# Patient Record
Sex: FEMALE
[De-identification: names, ages, dates, MRNs, and addresses within clinical notes are randomized per-mention and may not be internally consistent; named-entity substitution may affect disease eponyms.]

---

## 2017-01-11 ENCOUNTER — LAB SERVICES (OUTPATIENT)
Dept: OTHER | Age: 52
End: 2017-01-11

## 2017-01-11 ENCOUNTER — CHARTING TRANS (OUTPATIENT)
Dept: OTHER | Age: 52
End: 2017-01-11

## 2017-01-11 LAB — RAPID STREP GROUP A: NORMAL

## 2017-01-15 ENCOUNTER — HOSPITAL ENCOUNTER (OUTPATIENT)
Age: 52
Discharge: HOME | End: 2017-01-15
Attending: FAMILY MEDICINE
Payer: COMMERCIAL

## 2017-01-15 ENCOUNTER — APPOINTMENT (OUTPATIENT)
Dept: GENERAL RADIOLOGY | Age: 52
End: 2017-01-15
Attending: FAMILY MEDICINE
Payer: COMMERCIAL

## 2017-01-15 VITALS
WEIGHT: 165 LBS | SYSTOLIC BLOOD PRESSURE: 117 MMHG | RESPIRATION RATE: 14 BRPM | TEMPERATURE: 99 F | HEART RATE: 92 BPM | BODY MASS INDEX: 27 KG/M2 | DIASTOLIC BLOOD PRESSURE: 81 MMHG | OXYGEN SATURATION: 99 %

## 2017-01-15 DIAGNOSIS — J40 BRONCHITIS: Primary | ICD-10-CM

## 2017-01-15 LAB — S PYO AG THROAT QL: POSITIVE

## 2017-01-15 PROCEDURE — 99214 OFFICE O/P EST MOD 30 MIN: CPT

## 2017-01-15 PROCEDURE — 99213 OFFICE O/P EST LOW 20 MIN: CPT

## 2017-01-15 PROCEDURE — 87430 STREP A AG IA: CPT

## 2017-01-15 PROCEDURE — 71020 XR CHEST PA + LAT CHEST (CPT=71020): CPT

## 2017-01-15 RX ORDER — AZITHROMYCIN 250 MG/1
TABLET, FILM COATED ORAL
Qty: 1 PACKAGE | Refills: 0 | Status: SHIPPED | OUTPATIENT
Start: 2017-01-15 | End: 2017-01-20

## 2017-01-15 NOTE — ED PROVIDER NOTES
Patient Seen in: [address]    History   Patient presents with:  Cough/URI    Stated Complaint: coughing    HPI    61-year-old female presents with 2 weeks of cough and sore throat.   She notes being seen on Wednesday at Comment: OCC      Review of Systems    Positive for stated complaint: coughing  Other systems are as noted in HPI. Constitutional and vital signs reviewed. All other systems reviewed and negative except as noted above.     PSFH elements r Oral Tab  500 mg once followed by 250 mg daily x 4 days  Qty: 1 Package Refills: 0

## 2017-01-15 NOTE — ED INITIAL ASSESSMENT (HPI)
Pt c/o coughing for the last week that has been getting progressively worse and she wants to be checked out for pneumonia.  Pt was prescribed Amoxicillin for strep throat and pt started using her inhaler again that she had from the past. No Active short of

## 2017-01-18 ENCOUNTER — OFFICE VISIT (OUTPATIENT)
Dept: INTERNAL MEDICINE CLINIC | Facility: CLINIC | Age: 52
End: 2017-01-18

## 2017-01-18 VITALS
WEIGHT: 174 LBS | DIASTOLIC BLOOD PRESSURE: 80 MMHG | HEIGHT: 65.5 IN | HEART RATE: 80 BPM | TEMPERATURE: 98 F | BODY MASS INDEX: 28.64 KG/M2 | SYSTOLIC BLOOD PRESSURE: 110 MMHG | OXYGEN SATURATION: 98 %

## 2017-01-18 DIAGNOSIS — J84.10 CALCIFIED GRANULOMA OF LUNG (HCC): ICD-10-CM

## 2017-01-18 DIAGNOSIS — J98.01 BRONCHOSPASM: ICD-10-CM

## 2017-01-18 DIAGNOSIS — J40 BRONCHITIS: ICD-10-CM

## 2017-01-18 DIAGNOSIS — Z76.89 ENCOUNTER TO ESTABLISH CARE: Primary | ICD-10-CM

## 2017-01-18 PROCEDURE — 99212 OFFICE O/P EST SF 10 MIN: CPT | Performed by: INTERNAL MEDICINE

## 2017-01-18 PROCEDURE — 99204 OFFICE O/P NEW MOD 45 MIN: CPT | Performed by: INTERNAL MEDICINE

## 2017-01-18 RX ORDER — CODEINE PHOSPHATE AND GUAIFENESIN 10; 100 MG/5ML; MG/5ML
10 SOLUTION ORAL EVERY 6 HOURS PRN
Qty: 240 ML | Refills: 0 | OUTPATIENT
Start: 2017-01-18 | End: 2017-04-21

## 2017-01-18 RX ORDER — PREDNISONE 10 MG/1
TABLET ORAL
Qty: 20 TABLET | Refills: 0 | Status: SHIPPED | OUTPATIENT
Start: 2017-01-18 | End: 2017-02-07 | Stop reason: ALTCHOICE

## 2017-01-18 RX ORDER — ALBUTEROL SULFATE 90 UG/1
AEROSOL, METERED RESPIRATORY (INHALATION)
Qty: 1 INHALER | Refills: 11 | Status: SHIPPED | OUTPATIENT
Start: 2017-01-18 | End: 2020-11-25

## 2017-02-07 ENCOUNTER — OFFICE VISIT (OUTPATIENT)
Dept: INTERNAL MEDICINE CLINIC | Facility: CLINIC | Age: 52
End: 2017-02-07

## 2017-02-07 VITALS
BODY MASS INDEX: 29 KG/M2 | RESPIRATION RATE: 14 BRPM | WEIGHT: 175 LBS | TEMPERATURE: 99 F | DIASTOLIC BLOOD PRESSURE: 70 MMHG | SYSTOLIC BLOOD PRESSURE: 108 MMHG | OXYGEN SATURATION: 98 % | HEART RATE: 74 BPM

## 2017-02-07 DIAGNOSIS — R05.9 COUGH: Primary | ICD-10-CM

## 2017-02-07 PROCEDURE — 99212 OFFICE O/P EST SF 10 MIN: CPT | Performed by: INTERNAL MEDICINE

## 2017-02-07 PROCEDURE — 99213 OFFICE O/P EST LOW 20 MIN: CPT | Performed by: INTERNAL MEDICINE

## 2017-02-07 RX ORDER — FLUTICASONE PROPIONATE AND SALMETEROL 250; 50 UG/1; UG/1
1 POWDER RESPIRATORY (INHALATION) EVERY 12 HOURS SCHEDULED
Qty: 60 EACH | Refills: 0 | Status: SHIPPED | OUTPATIENT
Start: 2017-02-07 | End: 2017-03-09

## 2017-02-07 RX ORDER — FLUTICASONE PROPIONATE AND SALMETEROL 250; 50 UG/1; UG/1
1 POWDER RESPIRATORY (INHALATION) EVERY 12 HOURS SCHEDULED
Qty: 60 EACH | Refills: 0 | OUTPATIENT
Start: 2017-02-07 | End: 2017-02-07

## 2017-02-07 NOTE — PROGRESS NOTES
Bashir Haider is a 49year old female. HPI:   Patient presents with:  Checkup: cough persists 3-4 wks  Chest Congestion  Cough       Patient is still troubled with the cough. I did review Dr. Peggy Burnett note from January 18, 2017. Cough is mostly dry. Abnormal Pap smear of cervix 2007      ascus pap, neg hpv, neg colpo   • Melanoma ([address] ~2007     Melanoma R thigh, level 0.36 - Dr. Radha [place] at [facility]   • Kidney infection 1974     age 7, [address]   • History of pneumonia ~2009     oupatient treatmen another visit. She is comfortable with this plan. The patient indicates understanding of these issues and agrees to the plan. The patient is asked to call in 7-10 days with progress report. Juan Francisco Long MD  2/7/2017  8:52 AM  Merlin Gulling

## 2017-04-21 ENCOUNTER — OFFICE VISIT (OUTPATIENT)
Dept: PODIATRY CLINIC | Facility: CLINIC | Age: 52
End: 2017-04-21

## 2017-04-21 DIAGNOSIS — M72.2 PLANTAR FASCIITIS, RIGHT: Primary | ICD-10-CM

## 2017-04-21 PROCEDURE — 99203 OFFICE O/P NEW LOW 30 MIN: CPT | Performed by: PODIATRIST

## 2017-04-21 PROCEDURE — 99212 OFFICE O/P EST SF 10 MIN: CPT | Performed by: PODIATRIST

## 2017-04-21 NOTE — PROGRESS NOTES
HPI:    Patient ID: Gina Shaffer is a 49year old female. HPI  This 78-year-old female presents as a new patient to me and is referred by family. Her chief complaint is right foot pain it has been present only for the last few weeks.   There is no hist x-ray.  Reevaluate in 2 weeks         ASSESSMENT/PLAN:   Plantar fasciitis, right  (primary encounter diagnosis)    No orders of the defined types were placed in this encounter.        Meds This Visit:  No prescriptions requested or ordered in this encounte

## 2017-05-04 ENCOUNTER — OFFICE VISIT (OUTPATIENT)
Dept: PODIATRY CLINIC | Facility: CLINIC | Age: 52
End: 2017-05-04

## 2017-05-04 DIAGNOSIS — M72.2 PLANTAR FASCIITIS, RIGHT: ICD-10-CM

## 2017-05-04 DIAGNOSIS — M77.50 TENDONITIS OF ANKLE OR FOOT: ICD-10-CM

## 2017-05-04 DIAGNOSIS — R52 PAIN: Primary | ICD-10-CM

## 2017-05-04 PROCEDURE — 99213 OFFICE O/P EST LOW 20 MIN: CPT | Performed by: PODIATRIST

## 2017-05-04 PROCEDURE — 99212 OFFICE O/P EST SF 10 MIN: CPT | Performed by: PODIATRIST

## 2017-05-04 RX ORDER — METHYLPREDNISOLONE 4 MG/1
TABLET ORAL
Qty: 1 PACKAGE | Refills: 0 | Status: SHIPPED | OUTPATIENT
Start: 2017-05-04 | End: 2017-05-24

## 2017-05-04 NOTE — PROGRESS NOTES
HPI:    Patient ID: Vani Collins is a 49year old female. HPI  This 52-year-old female presents with continued pain in the right foot. She would state that over the last couple of weeks she has not really any better.   The pain is noted when first luis alfredo shoes at all times no barefoot walking. I also switched her to a Medrol dose pack. I reviewed the use of the medication and my expectations. I instructed her to ice the lateral foot and ankle at least twice a day for 15 minutes.   We will plan to reevalu

## 2017-05-24 ENCOUNTER — OFFICE VISIT (OUTPATIENT)
Dept: PODIATRY CLINIC | Facility: CLINIC | Age: 52
End: 2017-05-24

## 2017-05-24 DIAGNOSIS — M76.71 PERONEAL TENDONITIS, RIGHT: Primary | ICD-10-CM

## 2017-05-24 PROCEDURE — 99213 OFFICE O/P EST LOW 20 MIN: CPT | Performed by: PODIATRIST

## 2017-05-24 PROCEDURE — 99212 OFFICE O/P EST SF 10 MIN: CPT | Performed by: PODIATRIST

## 2017-05-24 NOTE — PROGRESS NOTES
HPI:    Patient ID: Rick Sams is a 49year old female. HPI  This 52-year-old female presents with continued pain on the lateral aspect of the right foot.   She states that the oral medication helped for a couple of days even as much is 90% but shortl (primary encounter diagnosis)    No orders of the defined types were placed in this encounter.        Meds This Visit:  No prescriptions requested or ordered in this encounter    Imaging & Referrals:  MRI FOOT, RIGHT (CPT=73718)       VL#[id number]

## 2017-05-25 ENCOUNTER — TELEPHONE (OUTPATIENT)
Dept: PODIATRY CLINIC | Facility: CLINIC | Age: 52
End: 2017-05-25

## 2017-05-25 NOTE — TELEPHONE ENCOUNTER
Called [facility]IM and s/w Brie to initiate PA for MRI right foot w/o con. Gave clinicals per SCR OV notes. MRI approved auth # [id number] exp 6/23/17 at [address] [place] [address]. Called pt LMOM per MRI auth and exp date. Gave phone # to CS to make appt and f/u after MRI.  CB if any

## 2017-05-26 ENCOUNTER — TELEPHONE (OUTPATIENT)
Dept: PODIATRY CLINIC | Facility: CLINIC | Age: 52
End: 2017-05-26

## 2017-05-26 DIAGNOSIS — M76.71: Primary | ICD-10-CM

## 2017-05-26 NOTE — TELEPHONE ENCOUNTER
Patient states is getting MRI done on 05/27/17. Requesting to get MRI results over the phone (declined f/u appt)  when available since next available appt wont be until 06/19/17 and has been with pain on her foot for a while now. Please advise. Thank you.

## 2017-05-26 NOTE — TELEPHONE ENCOUNTER
Spoke to pt and informed her that I will be calling SCR on Tuesday when we reopen and have the MRI results and have SCR interpret those results for me. Informed pt that I will call her once I speak with Meryl Redmond on Tuesday.  Pt is aware that SCR will still be

## 2017-05-27 ENCOUNTER — HOSPITAL ENCOUNTER (OUTPATIENT)
Dept: MRI IMAGING | Age: 52
Discharge: HOME | End: 2017-05-27
Attending: PODIATRIST
Payer: COMMERCIAL

## 2017-05-27 DIAGNOSIS — M76.71 PERONEAL TENDONITIS, RIGHT: ICD-10-CM

## 2017-05-27 PROCEDURE — 73718 MRI LOWER EXTREMITY W/O DYE: CPT | Performed by: PODIATRIST

## 2017-05-31 PROCEDURE — L4386 NON-PNEUM WALK BOOT PRE CST: HCPCS | Performed by: PODIATRIST

## 2017-05-31 NOTE — TELEPHONE ENCOUNTER
Spoke to pt and informed her of SCR message below from today. Pt will come to ortho/podiatry office at [facility] in [place] today sometime between [address] [facility] today to be fitted with CAM walker and given PT order. Pt verbalized understanding.

## 2017-05-31 NOTE — TELEPHONE ENCOUNTER
Pt came by office and the following was completed:  - fitted pt with CAM Walker size small -- pt and myself agreed this was an appropriate size- Discussed pt wearing whenever she is weightbearing - Advised pt to not drive with boot on.   - gave PT referral

## 2017-05-31 NOTE — TELEPHONE ENCOUNTER
Per Dr. Helene Camp, after discussing MRI right foot report:  -- Pt has significant tendinitis of peroneus longus. -- Nothing is torn. -- Have pt come to office to fit with CAM walker.  Immobilize in CAM walker with all weightbearing.  -- Pt is to go to PT 3 t

## 2017-06-12 ENCOUNTER — TELEPHONE (OUTPATIENT)
Dept: PODIATRY CLINIC | Facility: CLINIC | Age: 52
End: 2017-06-12

## 2017-06-12 NOTE — TELEPHONE ENCOUNTER
Called [facility]IE RAMIREZ and spoke to [facility]. Informed her of SCR instructions. [facility] verbalized understanding.

## 2017-06-12 NOTE — TELEPHONE ENCOUNTER
Laina Childress asking how long pt will be using cam boot, also asking if pt can do weight bearing exercises without boot on? Pls call thank you.

## 2017-06-30 ENCOUNTER — TELEPHONE (OUTPATIENT)
Dept: ORTHOPEDICS CLINIC | Facility: CLINIC | Age: 52
End: 2017-06-30

## 2017-06-30 NOTE — TELEPHONE ENCOUNTER
Paola Aguero called back. Pt instructed on Dr. Joseluis Lopez response. Denies any further questions.

## 2017-06-30 NOTE — TELEPHONE ENCOUNTER
Pt here thought she had an  Appointment. She does not. Has been wearing tall cam walker. Has been going to therapy. States she is much better now after therapy. While wearing the boot but has mostly no pain.  Sometimes at the end of the day if standing a lo

## 2017-06-30 NOTE — TELEPHONE ENCOUNTER
im not anxious to reduce the boot too quickly, but gradual reduction in use over the next week - 10 days is fine. scr

## 2017-07-14 ENCOUNTER — OFFICE VISIT (OUTPATIENT)
Dept: PODIATRY CLINIC | Facility: CLINIC | Age: 52
End: 2017-07-14

## 2017-07-14 DIAGNOSIS — M76.71: Primary | ICD-10-CM

## 2017-07-14 PROCEDURE — 99212 OFFICE O/P EST SF 10 MIN: CPT | Performed by: PODIATRIST

## 2017-07-14 PROCEDURE — 99213 OFFICE O/P EST LOW 20 MIN: CPT | Performed by: PODIATRIST

## 2017-07-14 NOTE — PROGRESS NOTES
HPI:    Patient ID: Henry Doll is a 47year old female. HPI  This 26-year-old female presents for follow-up. Patient states that the immobilization occasional oral anti-inflammatories and physical therapy has made her approximately 65% better.   She

## 2017-08-04 ENCOUNTER — OFFICE VISIT (OUTPATIENT)
Dept: PODIATRY CLINIC | Facility: CLINIC | Age: 52
End: 2017-08-04

## 2017-08-04 DIAGNOSIS — M76.71: Primary | ICD-10-CM

## 2017-08-04 PROCEDURE — 99212 OFFICE O/P EST SF 10 MIN: CPT | Performed by: PODIATRIST

## 2017-08-04 NOTE — PROGRESS NOTES
HPI:    Patient ID: Dudley Lee is a 47year old female. HPI  This 27-year-old female presents with continued improvement as described. She is having less and less discomfort and feels as though she continues to improve.   Review of Systems  Patient i

## 2018-06-26 PROCEDURE — 87624 HPV HI-RISK TYP POOLED RSLT: CPT | Performed by: OBSTETRICS & GYNECOLOGY

## 2018-06-26 PROCEDURE — 88175 CYTOPATH C/V AUTO FLUID REDO: CPT | Performed by: OBSTETRICS & GYNECOLOGY

## 2018-07-13 PROBLEM — N60.12 FIBROCYSTIC CHANGES OF LEFT BREAST: Status: ACTIVE | Noted: 2018-07-13

## 2018-11-05 VITALS
RESPIRATION RATE: 16 BRPM | TEMPERATURE: 98.2 F | SYSTOLIC BLOOD PRESSURE: 102 MMHG | DIASTOLIC BLOOD PRESSURE: 60 MMHG | HEART RATE: 81 BPM | HEIGHT: 66 IN | OXYGEN SATURATION: 98 %

## 2019-09-21 ENCOUNTER — HOSPITAL ENCOUNTER (OUTPATIENT)
Age: 54
Discharge: ANOTHER HOSPITAL | End: 2019-09-21
Attending: EMERGENCY MEDICINE
Payer: COMMERCIAL

## 2019-09-21 VITALS
HEIGHT: 66 IN | RESPIRATION RATE: 18 BRPM | TEMPERATURE: 98 F | WEIGHT: 189 LBS | BODY MASS INDEX: 30.37 KG/M2 | HEART RATE: 82 BPM | OXYGEN SATURATION: 99 % | DIASTOLIC BLOOD PRESSURE: 77 MMHG | SYSTOLIC BLOOD PRESSURE: 109 MMHG

## 2019-09-21 DIAGNOSIS — R10.9 ABDOMINAL PAIN, ACUTE: Primary | ICD-10-CM

## 2019-09-21 PROCEDURE — 99212 OFFICE O/P EST SF 10 MIN: CPT

## 2019-09-21 PROCEDURE — 99213 OFFICE O/P EST LOW 20 MIN: CPT

## 2019-09-21 NOTE — ED PROVIDER NOTES
Patient Seen in: [address]      History   Patient presents with:  Abdomen/Flank Pain (GI/)    Stated Complaint: stomach pain    HPI    Patient complains of abdominal pain.   Patient stated about 6 days ago she had chil 99 %   O2 Device None (Room air)       Current:/77   Pulse 82   Temp 98.4 °F (36.9 °C) (Oral)   Resp 18   Ht 167.6 cm (5' 6\")   Wt 85.7 kg   LMP 05/08/2016 (Approximate)   SpO2 99%   BMI 30.51 kg/m²         Physical Exam    Patient is awake and aler

## 2019-11-15 ENCOUNTER — NURSE ONLY (OUTPATIENT)
Dept: INTERNAL MEDICINE CLINIC | Facility: CLINIC | Age: 54
End: 2019-11-15
Payer: COMMERCIAL

## 2019-11-15 DIAGNOSIS — Z23 NEED FOR INFLUENZA VACCINATION: Primary | ICD-10-CM

## 2019-11-15 PROCEDURE — 90686 IIV4 VACC NO PRSV 0.5 ML IM: CPT | Performed by: INTERNAL MEDICINE

## 2019-11-15 PROCEDURE — 90471 IMMUNIZATION ADMIN: CPT | Performed by: INTERNAL MEDICINE

## 2019-12-30 PROBLEM — Z80.0 FAMILY HISTORY OF COLON CANCER IN FATHER: Status: ACTIVE | Noted: 2019-12-30

## 2019-12-30 PROBLEM — Z86.0101 HISTORY OF ADENOMATOUS POLYP OF COLON: Status: ACTIVE | Noted: 2019-12-30

## 2019-12-30 PROBLEM — Z86.010 HISTORY OF ADENOMATOUS POLYP OF COLON: Status: ACTIVE | Noted: 2019-12-30

## 2019-12-30 PROCEDURE — 88305 TISSUE EXAM BY PATHOLOGIST: CPT | Performed by: INTERNAL MEDICINE

## 2020-03-19 ENCOUNTER — OFFICE VISIT (OUTPATIENT)
Dept: INTERNAL MEDICINE CLINIC | Facility: CLINIC | Age: 55
End: 2020-03-19
Payer: COMMERCIAL

## 2020-03-19 ENCOUNTER — TELEPHONE (OUTPATIENT)
Dept: INTERNAL MEDICINE CLINIC | Facility: CLINIC | Age: 55
End: 2020-03-19

## 2020-03-19 VITALS
HEIGHT: 66 IN | TEMPERATURE: 98 F | SYSTOLIC BLOOD PRESSURE: 116 MMHG | HEART RATE: 68 BPM | BODY MASS INDEX: 30.53 KG/M2 | DIASTOLIC BLOOD PRESSURE: 80 MMHG | WEIGHT: 190 LBS | OXYGEN SATURATION: 99 %

## 2020-03-19 DIAGNOSIS — S61.011A LACERATION OF RIGHT THUMB WITHOUT FOREIGN BODY WITHOUT DAMAGE TO NAIL, INITIAL ENCOUNTER: Primary | ICD-10-CM

## 2020-03-19 PROCEDURE — 90471 IMMUNIZATION ADMIN: CPT | Performed by: INTERNAL MEDICINE

## 2020-03-19 PROCEDURE — 90715 TDAP VACCINE 7 YRS/> IM: CPT | Performed by: INTERNAL MEDICINE

## 2020-03-19 PROCEDURE — 99213 OFFICE O/P EST LOW 20 MIN: CPT | Performed by: INTERNAL MEDICINE

## 2020-03-19 NOTE — PROGRESS NOTES
Inder Griffith is a 49year old female. Patient presents with: Injury: R Thumb     HPI:   Patient presents with: Injury: R Thumb     Patient is seen today for evaluation of a laceration to her right thumb.   Patient injured her right thumb working with No       REVIEW OF SYSTEMS:   GENERAL HEALTH: feels well otherwise  RESPIRATORY:No cough or SOB  CARDIOVASCULAR: No chest pain  GI: No abdominal pain, nausea, vomiting, diarrhea, or constipation  :No Urinary complaints  EXT:No complaints of pain or swell scheduled. Syed Carl MD  3/19/2020  11:47 AM

## 2020-03-19 NOTE — PATIENT INSTRUCTIONS
1.  Patient is to continue her current diet, medications and activity. 2.  Patient was instructed to keep the thumb clean. She may use a warm face cloth to clean up some dried blood if necessary.   Patient to change a Band-Aid over the area twice a day or

## 2020-03-19 NOTE — TELEPHONE ENCOUNTER
I spoke with patient and she cut the pad of her right thumb with a kitchen slicer on Sunday night. She thought about urgent care for stitches on Monday but was hesitant to go out at this time. She has it wrapped. There is no bleeding right now. It is red with minimal swelling, no drainage. There is no fever, no chills or sweats. She is not sure if she has had a Tdap vaccine in past 10 years. She would prefer to use [facility] in [facility] if anything is needed. Explained that I would relay her concerns to the doctor on call. She verbalized understanding. Dr. Audrey Salazar, please advise.

## 2020-03-19 NOTE — TELEPHONE ENCOUNTER
Cut right thumb on Sunday  Calling for direction as she is in pain and has a gaping wound    [phone]

## 2020-03-19 NOTE — TELEPHONE ENCOUNTER
Telephone call to patient and situation discussed. Patient has not had a tetanus shot in the last 10 years according to our records. Patient has a wound on her thumb which is fairly deep but does not appear to be infected. I have advised the patient to come to see me today at 11:30 so I can check the wound we can also give her a Tdap vaccine. I will route this to nursing to note that work I will see the patient 11:30 today. Nursing can forward this to the .   Thank you!!

## 2020-11-16 ENCOUNTER — TELEPHONE (OUTPATIENT)
Dept: INTERNAL MEDICINE CLINIC | Facility: CLINIC | Age: 55
End: 2020-11-16

## 2020-11-16 ENCOUNTER — APPOINTMENT (OUTPATIENT)
Dept: CT IMAGING | Age: 55
End: 2020-11-16
Attending: NURSE PRACTITIONER
Payer: COMMERCIAL

## 2020-11-16 ENCOUNTER — HOSPITAL ENCOUNTER (OUTPATIENT)
Age: 55
Discharge: HOME | End: 2020-11-16
Payer: COMMERCIAL

## 2020-11-16 VITALS
TEMPERATURE: 98 F | RESPIRATION RATE: 18 BRPM | SYSTOLIC BLOOD PRESSURE: 133 MMHG | OXYGEN SATURATION: 98 % | HEART RATE: 96 BPM | DIASTOLIC BLOOD PRESSURE: 92 MMHG

## 2020-11-16 DIAGNOSIS — R10.9 ABDOMINAL PAIN, ACUTE: Primary | ICD-10-CM

## 2020-11-16 PROCEDURE — 80047 BASIC METABLC PNL IONIZED CA: CPT

## 2020-11-16 PROCEDURE — 99214 OFFICE O/P EST MOD 30 MIN: CPT

## 2020-11-16 PROCEDURE — 74177 CT ABD & PELVIS W/CONTRAST: CPT | Performed by: NURSE PRACTITIONER

## 2020-11-16 PROCEDURE — 96360 HYDRATION IV INFUSION INIT: CPT

## 2020-11-16 PROCEDURE — 85025 COMPLETE CBC W/AUTO DIFF WBC: CPT | Performed by: NURSE PRACTITIONER

## 2020-11-16 PROCEDURE — 81002 URINALYSIS NONAUTO W/O SCOPE: CPT

## 2020-11-16 RX ORDER — SODIUM CHLORIDE 9 MG/ML
1000 INJECTION, SOLUTION INTRAVENOUS ONCE
Status: COMPLETED | OUTPATIENT
Start: 2020-11-16 | End: 2020-11-16

## 2020-11-16 NOTE — ED NOTES
ISTAT OBTAINED, RESULTS AS FOLLOWS:  NA: 142  K 3.7  Cl 104  iCA 1.23  tCO2 22  Glu 99  BUN 16  Creatinine 0.8  HCT % 45

## 2020-11-16 NOTE — TELEPHONE ENCOUNTER
Spoke to patient who reports abdominal pain started yesterday. Pain is the L lower abdomen, pain at worse is 7/10. She is taking advil which has been helping with the pain. Pain seems to be pretty constant.  She denies any diarrhea, change in bowel habits,

## 2020-11-16 NOTE — ED INITIAL ASSESSMENT (HPI)
PATIENT REPORTS HX OF DIVERTICULITIS.  + LLQ PAIN SINCE YESTERDAY. SPOKE TO HER PCP WHO RECOMMENDS CT.  DENIES DIARRHEA, DENIES EMESIS.

## 2020-11-16 NOTE — TELEPHONE ENCOUNTER
To nursing, tell patient, the best course of action would be for her to go to the emergency room because based on what she said, I expect she will need imaging that they can do expeditiously there. Thanks. (I last saw patient in 2017).

## 2020-11-16 NOTE — TELEPHONE ENCOUNTER
Patient calling for antibiotic. Diverticulitis has flared up. In the past has taken antibiotic and diet.      [place]    Best number to contact patient at [phone]

## 2020-11-17 RX ORDER — CIPROFLOXACIN 500 MG/1
500 TABLET, FILM COATED ORAL 2 TIMES DAILY
Qty: 14 TABLET | Refills: 0 | Status: SHIPPED | OUTPATIENT
Start: 2020-11-17 | End: 2020-11-25

## 2020-11-17 RX ORDER — METRONIDAZOLE 250 MG/1
250 TABLET ORAL 3 TIMES DAILY
Qty: 21 TABLET | Refills: 0 | Status: SHIPPED | OUTPATIENT
Start: 2020-11-17 | End: 2020-11-25

## 2020-11-17 NOTE — TELEPHONE ENCOUNTER
To nursing, I recommend Tylenol instead of Advil. I would recommend she start antibiotics for possible diverticulitis–Rx's pended for Cipro 500 mg twice daily #14 and Flagyl 250 mg 3 times daily #21. Please send it to her pharmacy.   Important she keep ap

## 2020-11-17 NOTE — TELEPHONE ENCOUNTER
Relayed MD's message to patient---verbalized understanding. She is scheduled to see Dr. Jose Salas tomorrow at 11am  Pt has been taking Advil for pain she takes 2 tablets up to 3 times daily-okay to continue this?  Pt was not prescribed any medication at immediate c

## 2020-11-17 NOTE — TELEPHONE ENCOUNTER
To nursing, please call patient–did the [facility] immediate care give her any prescriptions? The CAT scan was suggestive of colitis or a colon lesion. Will need further evaluation. The note in the chart is not complete.   She needs to see me in the office

## 2020-11-17 NOTE — TELEPHONE ENCOUNTER
To Dr. Rebeca Valdez - UC/ F/U -  Pt feeling better today, \"not quite as acute\" - see CT results. Eating bland diet ffor a few days  Denies fever/chills.

## 2020-11-17 NOTE — ED PROVIDER NOTES
Patient presents with:  Abdomen/Flank Pain      HPI:     Bakari Dumont is a 48year old female who presents with a chief complaint of generalized abdominal pain that is worse on the left that started yesterday.   The patient states a few days ago she did eat some p file        Non-medical: Not on file    Tobacco Use      Smoking status: Former Smoker      Smokeless tobacco: Never Used    Substance and Sexual Activity      Alcohol use:  Yes        Alcohol/week: 5.0 standard drinks        Types: 5 Standard drinks or equ quadrants with palpation. She also has some discomfort to the epigastric area. Negative McBurney's point. Negative Martini sign. No CVA tenderness. No distention or masses palpated. Bowel sounds are active in all 4 quadrants.   NEURO: No focal deficits (CST): Adenike Chance MD on 11/16/2020 at 5:53 PM          The patient's white blood cell count is 11. Her i-STAT is within normal limits. Her urine dip is negative. Her CT results are as above. There is are no signs of diverticulitis.   I reexamined

## 2020-11-18 ENCOUNTER — OFFICE VISIT (OUTPATIENT)
Dept: INTERNAL MEDICINE CLINIC | Facility: CLINIC | Age: 55
End: 2020-11-18
Payer: COMMERCIAL

## 2020-11-18 VITALS
DIASTOLIC BLOOD PRESSURE: 72 MMHG | BODY MASS INDEX: 32 KG/M2 | TEMPERATURE: 98 F | SYSTOLIC BLOOD PRESSURE: 118 MMHG | HEART RATE: 67 BPM | OXYGEN SATURATION: 97 % | RESPIRATION RATE: 14 BRPM | WEIGHT: 199 LBS

## 2020-11-18 DIAGNOSIS — K57.32 DIVERTICULITIS OF SIGMOID COLON: Primary | ICD-10-CM

## 2020-11-18 PROCEDURE — 99072 ADDL SUPL MATRL&STAF TM PHE: CPT | Performed by: INTERNAL MEDICINE

## 2020-11-18 PROCEDURE — 3078F DIAST BP <80 MM HG: CPT | Performed by: INTERNAL MEDICINE

## 2020-11-18 PROCEDURE — 3074F SYST BP LT 130 MM HG: CPT | Performed by: INTERNAL MEDICINE

## 2020-11-18 PROCEDURE — 99214 OFFICE O/P EST MOD 30 MIN: CPT | Performed by: INTERNAL MEDICINE

## 2020-11-18 NOTE — PROGRESS NOTES
Ruth Abdul is a 48year old female who presents for     Immediate care follow-up  Onset 11/15/20, LLQ abd pain. No diarrhea, change in bowels, nausea, vomiting, fever or chills.   Pt felt it was the same pain as when diagnosed in 10/2019 when mike torres CONTRAST  2019    acute sigmoid diverticulitis      Family History   Problem Relation Age of Onset   • Heart Attack Father          age 80   • Arthritis Father    • Melanoma Father    • Colon Cancer Father    • Other (Other) Father         katelin circumferential wall thickening sigmoid with surrounding inflammatory change, scattered colonic diverticulosis, no definite evid diverticula in region of colonic wall thickening.  Could reflect a nonspecific colitis.   Clinically picture is c/w sigmoid dive

## 2020-11-25 ENCOUNTER — OFFICE VISIT (OUTPATIENT)
Dept: INTERNAL MEDICINE CLINIC | Facility: CLINIC | Age: 55
End: 2020-11-25
Payer: COMMERCIAL

## 2020-11-25 VITALS
HEIGHT: 66 IN | BODY MASS INDEX: 31.98 KG/M2 | HEART RATE: 84 BPM | DIASTOLIC BLOOD PRESSURE: 82 MMHG | WEIGHT: 199 LBS | TEMPERATURE: 99 F | SYSTOLIC BLOOD PRESSURE: 130 MMHG

## 2020-11-25 DIAGNOSIS — K57.32 DIVERTICULITIS OF SIGMOID COLON: ICD-10-CM

## 2020-11-25 DIAGNOSIS — Z85.828 HISTORY OF SKIN CANCER: ICD-10-CM

## 2020-11-25 DIAGNOSIS — Z86.010 HISTORY OF ADENOMATOUS POLYP OF COLON: ICD-10-CM

## 2020-11-25 DIAGNOSIS — Z00.00 ANNUAL PHYSICAL EXAM: Primary | ICD-10-CM

## 2020-11-25 PROCEDURE — 99396 PREV VISIT EST AGE 40-64: CPT | Performed by: INTERNAL MEDICINE

## 2020-11-25 PROCEDURE — 99072 ADDL SUPL MATRL&STAF TM PHE: CPT | Performed by: INTERNAL MEDICINE

## 2020-11-25 PROCEDURE — 3075F SYST BP GE 130 - 139MM HG: CPT | Performed by: INTERNAL MEDICINE

## 2020-11-25 PROCEDURE — 3079F DIAST BP 80-89 MM HG: CPT | Performed by: INTERNAL MEDICINE

## 2020-11-25 PROCEDURE — 3008F BODY MASS INDEX DOCD: CPT | Performed by: INTERNAL MEDICINE

## 2020-11-25 RX ORDER — CIPROFLOXACIN 500 MG/1
500 TABLET, FILM COATED ORAL 2 TIMES DAILY
Qty: 14 TABLET | Refills: 0 | Status: SHIPPED | OUTPATIENT
Start: 2020-11-25 | End: 2021-05-23

## 2020-11-25 RX ORDER — METRONIDAZOLE 250 MG/1
250 TABLET ORAL 3 TIMES DAILY
Qty: 21 TABLET | Refills: 0 | Status: SHIPPED | OUTPATIENT
Start: 2020-11-25 | End: 2021-05-23

## 2020-11-25 NOTE — PROGRESS NOTES
Mignon Lopez is a 48year old female who presents for     Check at 1 wk and this visit done as annual physical    LLQ pain is 95% better. Occasionally a \"trace of pain\" in LLQ. Took cipro flagyl --finished exc has 1 flagyl left.      Last colonos per week      Comment: OCC    Drug use: No         Allergies:  No Known Allergies    Review of systems:  Constitutional:  No fever, loss of appetite or unintentional weight loss  Respiratory: No cough, wheezing or dyspnea  Cardiac: No chest pain or palpita melanoma R thigh 2007, SCC & BCC R leg 10/2020--Dr Radha Covington. Per pt she will do wider excision in Dec 2020.        Healthcare maintenance:  Gyn: Dr Laurent Gonzalez. Mammo-per Dr Laurent Gonzalez.    Colonoscopy-12/30/19–Moise Ruvalcaba -sigmoid diverticulosis and 3 adenomatous po

## 2021-02-06 ENCOUNTER — PATIENT MESSAGE (OUTPATIENT)
Dept: INTERNAL MEDICINE CLINIC | Facility: CLINIC | Age: 56
End: 2021-02-06

## 2021-02-06 DIAGNOSIS — E66.9 OBESITY (BMI 30-39.9): Primary | ICD-10-CM

## 2021-02-08 NOTE — TELEPHONE ENCOUNTER
To nursing, pls mail her the referral entered. Thanks. 1. Obesity (BMI 30-39.9)  - SPECIALTY (OTHER) - EXTERNAL  Integrative medicine Referral requested by pt.  Pt advised this office the PCP needed to enter a referral before an appt., Ext - RFL, Routin

## 2021-02-08 NOTE — TELEPHONE ENCOUNTER
From: Bette Solano  To: Norma Day MD  Sent: 2/6/2021 12:24 PM CST  Subject: Referral Request    Dr. Ruth Brennan - I would like a referral to see Dr. Mathew Mcbride, she is the Director of the  for [address] [place] [address] [place] [facility]  Kimberli Goldstein

## 2021-04-02 ENCOUNTER — IMMUNIZATION (OUTPATIENT)
Dept: LAB | Facility: HOSPITAL | Age: 56
End: 2021-04-02
Attending: HOSPITALIST
Payer: COMMERCIAL

## 2021-04-02 DIAGNOSIS — Z23 NEED FOR VACCINATION: Primary | ICD-10-CM

## 2021-04-02 PROCEDURE — 0011A SARSCOV2 VAC 100MCG/0.5ML IM: CPT

## 2021-04-30 ENCOUNTER — IMMUNIZATION (OUTPATIENT)
Dept: LAB | Facility: HOSPITAL | Age: 56
End: 2021-04-30
Attending: EMERGENCY MEDICINE
Payer: COMMERCIAL

## 2021-04-30 DIAGNOSIS — Z23 NEED FOR VACCINATION: Primary | ICD-10-CM

## 2021-04-30 PROCEDURE — 0012A SARSCOV2 VAC 100MCG/0.5ML IM: CPT

## 2021-05-20 ENCOUNTER — LAB ENCOUNTER (OUTPATIENT)
Dept: LAB | Facility: HOSPITAL | Age: 56
End: 2021-05-20
Attending: INTERNAL MEDICINE
Payer: COMMERCIAL

## 2021-05-20 DIAGNOSIS — Z00.00 ANNUAL PHYSICAL EXAM: ICD-10-CM

## 2021-05-20 PROCEDURE — 80053 COMPREHEN METABOLIC PANEL: CPT

## 2021-05-20 PROCEDURE — 36415 COLL VENOUS BLD VENIPUNCTURE: CPT

## 2021-05-20 PROCEDURE — 80061 LIPID PANEL: CPT

## 2021-05-20 PROCEDURE — 81001 URINALYSIS AUTO W/SCOPE: CPT | Performed by: INTERNAL MEDICINE

## 2021-05-20 PROCEDURE — 85025 COMPLETE CBC W/AUTO DIFF WBC: CPT

## 2021-05-20 PROCEDURE — 84443 ASSAY THYROID STIM HORMONE: CPT

## 2021-05-21 ENCOUNTER — TELEPHONE (OUTPATIENT)
Dept: INTERNAL MEDICINE CLINIC | Facility: CLINIC | Age: 56
End: 2021-05-21

## 2021-05-21 ENCOUNTER — PATIENT MESSAGE (OUTPATIENT)
Dept: INTERNAL MEDICINE CLINIC | Facility: CLINIC | Age: 56
End: 2021-05-21

## 2021-05-21 DIAGNOSIS — R82.90 ABNORMAL URINALYSIS: Primary | ICD-10-CM

## 2021-05-21 DIAGNOSIS — R79.89 ELEVATED LFTS: Primary | ICD-10-CM

## 2021-05-21 NOTE — TELEPHONE ENCOUNTER
From: Julia Fontenot  To: Lee Perez MD  Sent: 5/21/2021 4:51 PM CDT  Subject: Test Results Question    Dr. Mignon Rapp - I do not take any herbal supplements. All of the vitamins/supplements I take are in my file.  I drink 2-3 times per week, typica

## 2021-05-21 NOTE — TELEPHONE ENCOUNTER
I sent patient results note Aimee Gilford, your lab results show some abnormalities that include   1) elevated liver enzymes (ALT 93, AST 50)--these levels were normal in 2015. Could you please let us know–are you taking any new medications?   Any her

## 2021-05-23 RX ORDER — CALCIUM CARBONATE 500 MG/1
TABLET, CHEWABLE ORAL
COMMUNITY
Start: 2021-01-01

## 2021-06-01 NOTE — TELEPHONE ENCOUNTER
Patient called to inquire about how to go about doing repeat blood work, plus urine test. Instructions provided. Call transferred to schedule lab appt.

## 2021-06-04 ENCOUNTER — TELEPHONE (OUTPATIENT)
Dept: INTERNAL MEDICINE CLINIC | Facility: CLINIC | Age: 56
End: 2021-06-04

## 2021-06-04 ENCOUNTER — LAB ENCOUNTER (OUTPATIENT)
Dept: LAB | Facility: HOSPITAL | Age: 56
End: 2021-06-04
Attending: INTERNAL MEDICINE
Payer: COMMERCIAL

## 2021-06-04 DIAGNOSIS — R79.89 ELEVATED LFTS: ICD-10-CM

## 2021-06-04 DIAGNOSIS — R82.90 ABNORMAL URINALYSIS: ICD-10-CM

## 2021-06-04 PROCEDURE — 36415 COLL VENOUS BLD VENIPUNCTURE: CPT

## 2021-06-04 PROCEDURE — 87086 URINE CULTURE/COLONY COUNT: CPT

## 2021-06-04 PROCEDURE — 80076 HEPATIC FUNCTION PANEL: CPT

## 2021-06-04 NOTE — TELEPHONE ENCOUNTER
I sent pt results note email--  Devika Duenas, your liver blood tests are now normal. The urine culture is still pending. Dr Luis Yang. 6/4/21--hepatic fcn panel -nl. Ucx pending.

## 2021-06-08 ENCOUNTER — TELEPHONE (OUTPATIENT)
Dept: INTERNAL MEDICINE CLINIC | Facility: CLINIC | Age: 56
End: 2021-06-08

## 2021-06-08 NOTE — TELEPHONE ENCOUNTER
I sent pt results not email. Ricardo Campbell, your urine culture done 6/4/21 is negative. As you recall the urinalysis of 5/20/21 showed microscopic blood with 3-5 red blood cells under the microscope.    Since there is not urinary tract infection to explain the

## 2021-06-10 ENCOUNTER — PATIENT MESSAGE (OUTPATIENT)
Dept: INTERNAL MEDICINE CLINIC | Facility: CLINIC | Age: 56
End: 2021-06-10

## 2021-06-10 NOTE — TELEPHONE ENCOUNTER
From: Lincoln Hunter  To: Rebecca Reese MD  Sent: 6/10/2021 2:02 PM CDT  Subject: Test Results Question    Dr. Bryanna Gilliam I made an appointment with Dr. Lit Newman and the first available time is July 19th.  How urgent is it that I see someone in your

## 2021-06-23 ENCOUNTER — OFFICE VISIT (OUTPATIENT)
Dept: SURGERY | Facility: CLINIC | Age: 56
End: 2021-06-23
Payer: COMMERCIAL

## 2021-06-23 VITALS — DIASTOLIC BLOOD PRESSURE: 81 MMHG | SYSTOLIC BLOOD PRESSURE: 127 MMHG | HEART RATE: 67 BPM

## 2021-06-23 DIAGNOSIS — R31.29 MICROHEMATURIA: Primary | ICD-10-CM

## 2021-06-23 PROCEDURE — 99243 OFF/OP CNSLTJ NEW/EST LOW 30: CPT | Performed by: NURSE PRACTITIONER

## 2021-06-23 PROCEDURE — 3074F SYST BP LT 130 MM HG: CPT | Performed by: NURSE PRACTITIONER

## 2021-06-23 PROCEDURE — 3079F DIAST BP 80-89 MM HG: CPT | Performed by: NURSE PRACTITIONER

## 2021-06-23 NOTE — PROGRESS NOTES
HPI/Subjective:     Patient ID: Karolina Woodward is a 55year old female.     HPI     Patient is a 55year old female who presents to the clinic for consult at the request of, and a copy of this note will be sent to, Dr. Lynn Lala regarding hematuria Amy   • Vitamin D deficiency       Past Surgical History:   Procedure Laterality Date   •       x2   • COLONOSCOPY  15    DR Merleen Aase   • COLONOSCOPY  2019   • CT ABDOMEN AND PELVIS WITH CONTRAST  2019    acute sigmoid diverti lengthy discussion with Amrit Grady regarding the diagnosis and definition of asymptomatic microscopic hematuria.   We went over the relevant anatomy as well as the different possible etiologies and differential diagnoses including benign causes such

## 2021-07-20 ENCOUNTER — HOSPITAL ENCOUNTER (OUTPATIENT)
Dept: ULTRASOUND IMAGING | Facility: HOSPITAL | Age: 56
Discharge: HOME | End: 2021-07-20
Attending: NURSE PRACTITIONER
Payer: COMMERCIAL

## 2021-07-20 DIAGNOSIS — R31.29 MICROHEMATURIA: ICD-10-CM

## 2021-07-20 PROCEDURE — 76775 US EXAM ABDO BACK WALL LIM: CPT | Performed by: NURSE PRACTITIONER

## 2021-07-28 ENCOUNTER — PROCEDURE (OUTPATIENT)
Dept: SURGERY | Facility: CLINIC | Age: 56
End: 2021-07-28
Payer: COMMERCIAL

## 2021-07-28 VITALS — SYSTOLIC BLOOD PRESSURE: 132 MMHG | DIASTOLIC BLOOD PRESSURE: 78 MMHG | WEIGHT: 199 LBS | BODY MASS INDEX: 32 KG/M2

## 2021-07-28 DIAGNOSIS — R31.29 MICROHEMATURIA: Primary | ICD-10-CM

## 2021-07-28 PROCEDURE — 3078F DIAST BP <80 MM HG: CPT | Performed by: UROLOGY

## 2021-07-28 PROCEDURE — 52000 CYSTOURETHROSCOPY: CPT | Performed by: UROLOGY

## 2021-07-28 PROCEDURE — 3075F SYST BP GE 130 - 139MM HG: CPT | Performed by: UROLOGY

## 2021-07-28 RX ORDER — CIPROFLOXACIN 500 MG/1
500 TABLET, FILM COATED ORAL ONCE
Status: COMPLETED | OUTPATIENT
Start: 2021-07-28 | End: 2021-07-28

## 2021-07-28 RX ADMIN — CIPROFLOXACIN 500 MG: 500 TABLET, FILM COATED ORAL at 17:49:00

## 2021-07-28 NOTE — PROGRESS NOTES
[facility]  Follow-Up Visit    HPI: Becky Patel is a 55year old female presents for a follow up visit. Patient was last seen on 6/23/21 by [employer].     INTERVAL HISTORY: Here for office cystoscopy to complete evaluation of micr (HHM=77083)    Result Date: 7/21/2021  PROCEDURE: US KIDNEYS (JNS=[id number])  COMPARISON: None. INDICATIONS: Microhematuria  TECHNIQUE: Ultrasound examination was performed to visualize the kidneys and bladder.    FINDINGS:  RIGHT KIDNEY: Normal.  Right kidney

## 2021-12-22 ENCOUNTER — IMMUNIZATION (OUTPATIENT)
Dept: LAB | Facility: HOSPITAL | Age: 56
End: 2021-12-22
Attending: EMERGENCY MEDICINE
Payer: COMMERCIAL

## 2021-12-22 DIAGNOSIS — Z23 NEED FOR VACCINATION: Primary | ICD-10-CM

## 2021-12-22 PROCEDURE — 0064A SARSCOV2 VAC 50MCG/0.25ML IM: CPT

## 2022-06-02 ENCOUNTER — OFFICE VISIT (OUTPATIENT)
Dept: INTERNAL MEDICINE CLINIC | Facility: CLINIC | Age: 57
End: 2022-06-02
Payer: COMMERCIAL

## 2022-06-02 ENCOUNTER — TELEPHONE (OUTPATIENT)
Dept: INTERNAL MEDICINE CLINIC | Facility: CLINIC | Age: 57
End: 2022-06-02

## 2022-06-02 VITALS
DIASTOLIC BLOOD PRESSURE: 92 MMHG | SYSTOLIC BLOOD PRESSURE: 132 MMHG | BODY MASS INDEX: 30.05 KG/M2 | OXYGEN SATURATION: 98 % | TEMPERATURE: 98 F | WEIGHT: 187 LBS | HEART RATE: 74 BPM | HEIGHT: 66 IN

## 2022-06-02 DIAGNOSIS — M54.41 ACUTE RIGHT-SIDED LOW BACK PAIN WITH RIGHT-SIDED SCIATICA: Primary | ICD-10-CM

## 2022-06-02 PROCEDURE — 3080F DIAST BP >= 90 MM HG: CPT | Performed by: INTERNAL MEDICINE

## 2022-06-02 PROCEDURE — 3075F SYST BP GE 130 - 139MM HG: CPT | Performed by: INTERNAL MEDICINE

## 2022-06-02 PROCEDURE — 3008F BODY MASS INDEX DOCD: CPT | Performed by: INTERNAL MEDICINE

## 2022-06-02 PROCEDURE — 99214 OFFICE O/P EST MOD 30 MIN: CPT | Performed by: INTERNAL MEDICINE

## 2022-06-02 RX ORDER — METHYLPREDNISOLONE 4 MG/1
TABLET ORAL
Qty: 1 EACH | Refills: 0 | Status: SHIPPED | OUTPATIENT
Start: 2022-06-02

## 2022-06-02 RX ORDER — CYCLOBENZAPRINE HCL 5 MG
5 TABLET ORAL 3 TIMES DAILY PRN
Qty: 60 TABLET | Refills: 0 | Status: SHIPPED | OUTPATIENT
Start: 2022-06-02

## 2022-06-02 NOTE — TELEPHONE ENCOUNTER
Spoke with patient. Discussed it would be best to be eval'd in clinic. Pt agreeable to office visit.  Scheduled today at 5:30pm with Dr. Reji Obregon.

## 2022-06-02 NOTE — TELEPHONE ENCOUNTER
Patient calling for muscle relaxer. Pulled her back over the weekend. Moving large flower planters. Pain is at Lower back to the right side, running into butt and back of right leg. Taking Aleive and Advil, helps some with pain. Pain level is 7-8 out of 10. Difficulty to sleeping.     Best call back number  [phone]    JAXSON [id number]

## 2022-06-27 PROCEDURE — 88305 TISSUE EXAM BY PATHOLOGIST: CPT | Performed by: DERMATOLOGY

## 2022-06-29 ENCOUNTER — LAB REQUISITION (OUTPATIENT)
Dept: LAB | Facility: HOSPITAL | Age: 57
End: 2022-06-29
Payer: COMMERCIAL

## 2022-06-29 DIAGNOSIS — D48.5 NEOPLASM OF UNCERTAIN BEHAVIOR OF SKIN: ICD-10-CM

## 2022-12-12 ENCOUNTER — HOSPITAL ENCOUNTER (OUTPATIENT)
Facility: HOSPITAL | Age: 57
Setting detail: HOSPITAL OUTPATIENT SURGERY
Discharge: HOME | End: 2022-12-12
Attending: INTERNAL MEDICINE | Admitting: INTERNAL MEDICINE
Payer: COMMERCIAL

## 2022-12-12 VITALS
HEIGHT: 66 IN | HEART RATE: 61 BPM | RESPIRATION RATE: 19 BRPM | SYSTOLIC BLOOD PRESSURE: 114 MMHG | BODY MASS INDEX: 28.93 KG/M2 | OXYGEN SATURATION: 99 % | TEMPERATURE: 98 F | DIASTOLIC BLOOD PRESSURE: 83 MMHG | WEIGHT: 180 LBS

## 2022-12-12 PROCEDURE — 99152 MOD SED SAME PHYS/QHP 5/>YRS: CPT | Performed by: INTERNAL MEDICINE

## 2022-12-12 PROCEDURE — 0DJD8ZZ INSPECTION OF LOWER INTESTINAL TRACT, VIA NATURAL OR ARTIFICIAL OPENING ENDOSCOPIC: ICD-10-PCS | Performed by: INTERNAL MEDICINE

## 2022-12-12 RX ORDER — SODIUM CHLORIDE, SODIUM LACTATE, POTASSIUM CHLORIDE, CALCIUM CHLORIDE 600; 310; 30; 20 MG/100ML; MG/100ML; MG/100ML; MG/100ML
INJECTION, SOLUTION INTRAVENOUS CONTINUOUS
Status: DISCONTINUED | OUTPATIENT
Start: 2022-12-12 | End: 2022-12-12

## 2022-12-12 RX ORDER — MIDAZOLAM HYDROCHLORIDE 1 MG/ML
1 INJECTION INTRAMUSCULAR; INTRAVENOUS EVERY 5 MIN PRN
Status: DISCONTINUED | OUTPATIENT
Start: 2022-12-12 | End: 2022-12-12

## 2022-12-12 RX ORDER — MIDAZOLAM HYDROCHLORIDE 1 MG/ML
INJECTION INTRAMUSCULAR; INTRAVENOUS
Status: DISCONTINUED | OUTPATIENT
Start: 2022-12-12 | End: 2022-12-12

## 2022-12-12 RX ORDER — SODIUM CHLORIDE 0.9 % (FLUSH) 0.9 %
10 SYRINGE (ML) INJECTION AS NEEDED
Status: DISCONTINUED | OUTPATIENT
Start: 2022-12-12 | End: 2022-12-12

## 2022-12-12 NOTE — DISCHARGE INSTRUCTIONS

## 2023-06-12 ENCOUNTER — TELEPHONE (OUTPATIENT)
Dept: INTERNAL MEDICINE CLINIC | Facility: CLINIC | Age: 58
End: 2023-06-12

## 2023-06-12 NOTE — TELEPHONE ENCOUNTER
Called patient who woke up this morning with left eye swollen and red, also sore - RN advised to be evaluated at [facility] - [facility]  F/U6/13

## 2023-06-12 NOTE — TELEPHONE ENCOUNTER
Patient was a patient of Dr Griselda Pancake. Carlotta up with Left  eye swollen and red. Patient also has eye soreness. Patient wants to be seen. No openings with any Dr. Please call patient and advise.      #[phone]

## 2023-06-13 NOTE — TELEPHONE ENCOUNTER
Seen at [facility] [place] yesterday, was told she has a sty to Lt eye. Was given a re-usable compress to apply to sty and Polymyxin B Sulfate ABX eye drops to be applied to eye 4 X a day. She has been applying warm compress and some yellow discharge has been expressed from area. Instructed to to go to ED if SX becomes worse or  having visual issues. Advised that it may take 48 - 72 hrs before she sees improvement.

## 2023-08-21 ENCOUNTER — TELEPHONE (OUTPATIENT)
Dept: INTERNAL MEDICINE CLINIC | Facility: CLINIC | Age: 58
End: 2023-08-21

## 2023-10-06 ENCOUNTER — TELEPHONE (OUTPATIENT)
Dept: INTERNAL MEDICINE CLINIC | Facility: CLINIC | Age: 58
End: 2023-10-06

## 2023-10-06 ENCOUNTER — LAB ENCOUNTER (OUTPATIENT)
Dept: LAB | Age: 58
End: 2023-10-06
Attending: INTERNAL MEDICINE
Payer: COMMERCIAL

## 2023-10-06 ENCOUNTER — OFFICE VISIT (OUTPATIENT)
Dept: INTERNAL MEDICINE CLINIC | Facility: CLINIC | Age: 58
End: 2023-10-06

## 2023-10-06 VITALS
OXYGEN SATURATION: 98 % | HEIGHT: 66 IN | WEIGHT: 188.19 LBS | SYSTOLIC BLOOD PRESSURE: 122 MMHG | BODY MASS INDEX: 30.24 KG/M2 | DIASTOLIC BLOOD PRESSURE: 72 MMHG | TEMPERATURE: 97 F | HEART RATE: 63 BPM

## 2023-10-06 DIAGNOSIS — R31.29 MICROHEMATURIA: ICD-10-CM

## 2023-10-06 DIAGNOSIS — Z00.00 ANNUAL PHYSICAL EXAM: ICD-10-CM

## 2023-10-06 DIAGNOSIS — R31.29 MICROHEMATURIA: Primary | ICD-10-CM

## 2023-10-06 DIAGNOSIS — R80.9 PROTEINURIA, UNSPECIFIED TYPE: Primary | ICD-10-CM

## 2023-10-06 LAB
ALBUMIN SERPL-MCNC: 3.9 G/DL (ref 3.4–5)
ALBUMIN/GLOB SERPL: 1.2 {RATIO} (ref 1–2)
ALP LIVER SERPL-CCNC: 59 U/L
ALT SERPL-CCNC: 43 U/L
ANION GAP SERPL CALC-SCNC: 7 MMOL/L (ref 0–18)
AST SERPL-CCNC: 27 U/L (ref 15–37)
BASOPHILS # BLD AUTO: 0.02 X10(3) UL (ref 0–0.2)
BASOPHILS NFR BLD AUTO: 0.4 %
BILIRUB SERPL-MCNC: 0.7 MG/DL (ref 0.1–2)
BILIRUB UR QL: NEGATIVE
BUN BLD-MCNC: 17 MG/DL (ref 7–18)
BUN/CREAT SERPL: 20 (ref 10–20)
CALCIUM BLD-MCNC: 9.1 MG/DL (ref 8.5–10.1)
CHLORIDE SERPL-SCNC: 111 MMOL/L (ref 98–112)
CHOLEST SERPL-MCNC: 162 MG/DL (ref ?–200)
CLARITY UR: CLEAR
CO2 SERPL-SCNC: 23 MMOL/L (ref 21–32)
COLOR UR: YELLOW
CREAT BLD-MCNC: 0.85 MG/DL
DEPRECATED RDW RBC AUTO: 43.2 FL (ref 35.1–46.3)
EGFRCR SERPLBLD CKD-EPI 2021: 80 ML/MIN/1.73M2 (ref 60–?)
EOSINOPHIL # BLD AUTO: 0.03 X10(3) UL (ref 0–0.7)
EOSINOPHIL NFR BLD AUTO: 0.6 %
ERYTHROCYTE [DISTWIDTH] IN BLOOD BY AUTOMATED COUNT: 13.2 % (ref 11–15)
FASTING PATIENT LIPID ANSWER: YES
FASTING STATUS PATIENT QL REPORTED: YES
GLOBULIN PLAS-MCNC: 3.2 G/DL (ref 2.8–4.4)
GLUCOSE BLD-MCNC: 97 MG/DL (ref 70–99)
GLUCOSE UR-MCNC: NORMAL MG/DL
HCT VFR BLD AUTO: 43.3 %
HDLC SERPL-MCNC: 46 MG/DL (ref 40–59)
HGB BLD-MCNC: 15.5 G/DL
HGB UR QL STRIP.AUTO: NEGATIVE
IMM GRANULOCYTES # BLD AUTO: 0.01 X10(3) UL (ref 0–1)
IMM GRANULOCYTES NFR BLD: 0.2 %
KETONES UR-MCNC: NEGATIVE MG/DL
LDLC SERPL CALC-MCNC: 101 MG/DL (ref ?–100)
LEUKOCYTE ESTERASE UR QL STRIP.AUTO: NEGATIVE
LYMPHOCYTES # BLD AUTO: 2.39 X10(3) UL (ref 1–4)
LYMPHOCYTES NFR BLD AUTO: 44.4 %
MCH RBC QN AUTO: 32.5 PG (ref 26–34)
MCHC RBC AUTO-ENTMCNC: 35.8 G/DL (ref 31–37)
MCV RBC AUTO: 90.8 FL
MONOCYTES # BLD AUTO: 0.31 X10(3) UL (ref 0.1–1)
MONOCYTES NFR BLD AUTO: 5.8 %
NEUTROPHILS # BLD AUTO: 2.62 X10 (3) UL (ref 1.5–7.7)
NEUTROPHILS # BLD AUTO: 2.62 X10(3) UL (ref 1.5–7.7)
NEUTROPHILS NFR BLD AUTO: 48.6 %
NITRITE UR QL STRIP.AUTO: NEGATIVE
NONHDLC SERPL-MCNC: 116 MG/DL (ref ?–130)
OSMOLALITY SERPL CALC.SUM OF ELEC: 293 MOSM/KG (ref 275–295)
PH UR: 5.5 [PH] (ref 5–8)
PLATELET # BLD AUTO: 263 10(3)UL (ref 150–450)
POTASSIUM SERPL-SCNC: 4 MMOL/L (ref 3.5–5.1)
PROT SERPL-MCNC: 7.1 G/DL (ref 6.4–8.2)
RBC # BLD AUTO: 4.77 X10(6)UL
SODIUM SERPL-SCNC: 141 MMOL/L (ref 136–145)
SP GR UR STRIP: 1.02 (ref 1–1.03)
TRIGL SERPL-MCNC: 76 MG/DL (ref 30–149)
UROBILINOGEN UR STRIP-ACNC: NORMAL
VLDLC SERPL CALC-MCNC: 13 MG/DL (ref 0–30)
WBC # BLD AUTO: 5.4 X10(3) UL (ref 4–11)

## 2023-10-06 PROCEDURE — 80053 COMPREHEN METABOLIC PANEL: CPT

## 2023-10-06 PROCEDURE — 85025 COMPLETE CBC W/AUTO DIFF WBC: CPT

## 2023-10-06 PROCEDURE — 80061 LIPID PANEL: CPT

## 2023-10-06 PROCEDURE — 81003 URINALYSIS AUTO W/O SCOPE: CPT

## 2023-10-06 PROCEDURE — 36415 COLL VENOUS BLD VENIPUNCTURE: CPT

## 2023-10-06 RX ORDER — PROGESTERONE 100 MG/1
100 CAPSULE ORAL DAILY
COMMUNITY

## 2023-10-06 RX ORDER — LEVOTHYROXINE, LIOTHYRONINE 19; 4.5 UG/1; UG/1
30 TABLET ORAL
COMMUNITY

## 2023-10-06 RX ORDER — ESTRADIOL 0.04 MG/D
1 PATCH TRANSDERMAL WEEKLY
COMMUNITY

## 2024-01-08 NOTE — MR AVS SNAPSHOT
[facility] [place]  [address] [place] [address]  [phone]               Thank you for choosing us for your health care visit with Shanna Lopez MD.  We are glad to serve you and happy to provide you with this summary of your visit.   Catalina On 1/8/2024LUZ MARIA Kevin L Mussatti, CT scribed the services personally performed by Ila Robert DC.        Date of Injury:01/08/24   Initial Treatment Date:01/08/24  Previous Treatment Date: 12/11/2023  Current Treatment Date: 1/8/2024   Chief Complaint   Patient presents with    Pain    Office Visit      Mechanism of Injury: Suddenly  Date informed consent signed:  09/19/2023  Advance Beneficiary Notice signed: not appicable at this time    Visit Number of Current Episode:  PRN ( 2 of 3   [facility]  12/11/2023 - 1/11/2024)  Discharged from Active Care: No    Occupation:   Referred by: Maryjane Huynh / patient  Primary Care Physician: Sondra Knight MD       Subjective:  Erum is a pleasant 65 year old female that presents to our office today for treatment of back pain with lower back regions, more right sided without radicular symptoms.     Post treatment felt ok. Recently went to the [facility] and did an intense class and it made her lower back hurt on the right. Its still a little sore today.  Upper back between the shoulder blades is still crackly.       Current History SOI:  Is having tightness in right lower back. The back seems like it has more cracking all over. Was recently on a river cruise and traveling. Also had her 1 1/2 yr old Grandson for 5 days, which was physically strenuous with bending and lifting the big wilmer :) No pain radiating into the glutes.   Otherwise has been good overall.  Last treatment was in Dec of 2022    Past SOI:  Has had SI pain off and on. Has been diagnosed with osteoporosis and didn't know if she could get adjustments with that.  Few weeks ago slept on a bad mattress and since then has had pain in the right hip area and right leg a little. Has been doing some restorative yoga to help stretch. No leg pain today. Is having more hip pain.             Patient rates the current pain at a 4/10 with 10 being the worst.     (09/19/2023)  During the past 24 hours how has pain  days, then 1 tablet for 2 days. Commonly known as:  DELTASONE           Vitamin C 500 MG Tabs   Take 500 mg by mouth daily. Commonly known as:  VITAMIN C           Vitamin D3 67671 UNITS Caps   Take 1 capsule by mouth once a week.                 Where interfered with your normal activities: 4/10  During the past 24 hours how has pain interfered with you sleep: 4/10  During the past 24 hours how has pain affected your mood: 0/10  During the past 24 hours how has pain contributed to your stress: 0/10    Functional Disability 8/40    Patient describes pain as aching and stiff  Patient’s current work status: Currently working.  Patient notes that the pain is worse when sitting  Patient notes that the pain is reduced with  restorivative yoga  Patient has not sought prior medical treatment for this episode.  Patient has not sought prior Chiropractic treatment for this episode.    Diagnostic Studies relevant to this episode: None  Hospitalizations relevant to this episode: NO    I have reviewed the past medical history, past surgical history, family history, social history, medications and allergies listed in the medical record as obtained by my nursing staff and support staff and agree with their documentation.    REVIEW OF SYSTEMS  Constitutional: Negative for fever chills, malaise, weight loss/gain, or loss of appetite.    Skin: Negative for rash, or persistent itching.   HEENT: Negative for eye drainage, ear pain, sore throat, or sinus problems.  Respiratory: Negative cough, wheezing, or shortness of breath.    Cardiovascular: Negative for chest pain, chest pressure, palpitations, leg cramps, or lower extremity edema.  Gastrointestinal: Negative for nausea, vomiting, diarrhea, abdominal pain, constipation, or heartburn.    Genitourinary: Negative for dysuria, frequency, hematuria, or nocturia.   Extremities:  Negative for joint swelling or joint pain.  Endocrine: Negative for heat or cold intolerance.  Psychiatric: Negative for change in mood, mentation, anxiety, depression, or insomnia.   Neurologic: Negative for visual changes, loss of balance, dizziness, or tremors.       __________________________________________________________________________________________________________    Objective Findings:   There were no vitals taken for this visit.   Patient scored a 8/40 on the [employer] /VA.    Problem focus examination revealed:    (Thoracic spine) Thoracic spine facet joint function is within normal limits except for low force, NOT ASSESSED that exhibited limited passive range of motion and segmental restriction and tenderness upon palpation; The following muscles were examined for normal flexibility and tone; right rhomboid, left rhomboid, left scapulothoracic, right scapulothoracic, right thoracic paraspinals and left thoracic paraspinals. These muscles were within normal limits except for right thoracic paraspinals and left thoracic paraspinals that exhibited limited flexibility and abnormal resting tone.    (Lumbar and Pelvic spine) Lumbar spine facet joint function is within normal limits except for L3/4-L4/5 left lateral flexion extension.  Sacroiliac joint function is within normal limits.  The following muscles were examined for flexibility and tone at rest; right hip flexor, left hip flexor, right piriformis, left piriformis, right hamstring, left hamstring, right lumbar paraspinals, left lumbar paraspinals, right gluteus medius, left gluteus medius, right quadratus lumborum, left quadratus lumborum, right internal hip rotators, left internal hip rotators, right quadriceps, left quadriceps, right lumbosacral and left lumbosacral. These muscles were found to be within normal limits except for right quadratus lumborum and right lumbosacral that exhibited limited flexibility and were hypertonic at rest.       Assessment:  1. Somatic dysfunction of lumbar region    2. Facet syndrome, lumbar            Plan:  Following brief reassessment of joint function and muscular tonicity, Erum also treated with low force, side posture lumbar manual manipulation to improve function and passive  range of motion of facet joints noted.    Prior to manipulative therapies, Erum was treated with brief soft tissue mobilization to muscles noted as taut in objective findings to increase circulation, improve flexibility and decrease strain to associated spinal structures.     Goal of care is to reduce muscular spasms, decrease pain and return to normal athletic activities. Erum will return later this week. Total treatment time was 15 minutes    The documentation recorded by ROX Nelson accurately and completely reflects the service(s), I personally performed and the decisions made by me, Ila Campuzano D.C.

## 2024-03-11 ENCOUNTER — PATIENT MESSAGE (OUTPATIENT)
Dept: INTERNAL MEDICINE CLINIC | Facility: CLINIC | Age: 59
End: 2024-03-11

## 2024-03-11 NOTE — TELEPHONE ENCOUNTER
From: Mercedez Garcia  To: Grisel Anna  Sent: 3/11/2024 11:56 AM CDT  Subject: Supplement Interaction    Hi - My son currently takes Nutrafol for men and I want him to begin taking Omega-3 and Saffron supplements. I just want to make sure there is no danger in taking all three. I tried to attach the ingredients but it won't let me attach a picture. Thanks.     Mercedez

## 2024-05-28 ENCOUNTER — HOSPITAL ENCOUNTER (OUTPATIENT)
Dept: CT IMAGING | Facility: HOSPITAL | Age: 59
Discharge: HOME | End: 2024-05-28
Attending: INTERNAL MEDICINE

## 2024-05-28 VITALS — DIASTOLIC BLOOD PRESSURE: 80 MMHG | SYSTOLIC BLOOD PRESSURE: 112 MMHG

## 2024-05-28 DIAGNOSIS — Z13.6 SCREENING FOR CARDIOVASCULAR CONDITION: ICD-10-CM

## 2024-05-28 LAB
POCT GLUCOSE CHOLESTECH: 94 (ref 70–99)
POCT HDL: 43 (ref 40–60)
POCT LDL: 107 (ref 0–99)
POCT TOTAL CHOLESTEROL: 192 (ref 110–200)
POCT TRIGLYCERIDES: 211 (ref 1–149)

## 2024-05-28 NOTE — PROGRESS NOTES
Date of Service 5/28/2024    MAIN WONG  Date of Birth 6/14/1965    Patient Age: 58 year old    PCP: Grisel Anna, DO  [address] [place] [id number]    Heart Scan Consult  Preliminary Heart Scan Score: 1.32    Previous Screening  Heart Scan Completed Previously: No        Peripheral Vascular Scan Completed Previously: No          Risk Factors  Personal Risk Factors  Non-alterable Risk Factors: Age;Gender  Alterable Risk Factors: Abnormal Cholesterol;Stress      Body Mass Index  There is no height or weight on file to calculate BMI.    Blood Pressure     /80 (BP Location: Right arm)     (Normal =< 120/80,  Elevated = 120-129/ >80,  High Stage1 130-139/80-89 , Stage2 >140/>90)    Lipid Profile  Patient was in fasting state: No    Cholesterol: 192, done on 5/28/2024.  HDL Cholesterol: 43, done on 5/28/2024.  LDL Cholesterol: 107, done on 5/28/2024.  TriGlycerides 211, done on 5/28/2024.    Cholesterol Goals  Value   Total  =< 200   HDL  = > 45 Men = > 55 Women   LDL   =< 100   Triglycerides  =< 150       Glucose and Hemoglobin A1C  Lab Results   Component Value Date    GLU 97 10/06/2023    A1C 4.6 05/27/2015     (Normal Fasting Glucose < 100mg/dl )    Nurse Review  Risk factor information and results reviewed with Nurse: Yes    Recommended Follow Up:  Consult your physician regarding:: Final Heart Scan Report;Discuss potential for Incidental Finding;Discuss Potential for Score Variance      Recommendations for Change:  Nutrition Changes: Low Saturated Fat;Low Fat Dairy;Low Salt Eating;Increase Fiber    Cholesterol Modification (goal of therapy depends upon your risk): Increase HDL (Healthy/Good) Normal >45 Men >55 Women;Decrease LDL (Lousy/Bad) Ideal <100    Exercise:  (Reports exercising regularly)         Weight Management:  (Reports, goal to lose weight)    Stress Management: Adopt Stress Management Techniques    Repeat Heart Scan: 3 Years if Calcium Score is > 0.0;Discuss with your Physician               [facility] Recommended Resources:  Recommended Resources: PV Screening;Upcoming Classes, Medical Services and Health Library [email or web address]    Recommended PV Screening: Carotids;Abdomen    Other Resources:: Handouts given - Controlling Risk Factors, Cholesterol, and Stress      Lashae TORRES, RN        Please Contact the Nurse Heart Line with any Questions or Concerns [phone].

## 2024-05-30 ENCOUNTER — TELEPHONE (OUTPATIENT)
Dept: INTERNAL MEDICINE CLINIC | Facility: CLINIC | Age: 59
End: 2024-05-30

## 2025-02-03 ENCOUNTER — HOSPITAL ENCOUNTER (OUTPATIENT)
Dept: GENERAL RADIOLOGY | Facility: HOSPITAL | Age: 60
Discharge: HOME | End: 2025-02-03
Attending: INTERNAL MEDICINE
Payer: COMMERCIAL

## 2025-02-03 ENCOUNTER — OFFICE VISIT (OUTPATIENT)
Dept: INTERNAL MEDICINE CLINIC | Facility: CLINIC | Age: 60
End: 2025-02-03

## 2025-02-03 ENCOUNTER — TELEPHONE (OUTPATIENT)
Dept: INTERNAL MEDICINE CLINIC | Facility: CLINIC | Age: 60
End: 2025-02-03

## 2025-02-03 VITALS
BODY MASS INDEX: 31.18 KG/M2 | SYSTOLIC BLOOD PRESSURE: 122 MMHG | HEIGHT: 66 IN | HEART RATE: 82 BPM | OXYGEN SATURATION: 98 % | WEIGHT: 194 LBS | TEMPERATURE: 98 F | DIASTOLIC BLOOD PRESSURE: 74 MMHG

## 2025-02-03 DIAGNOSIS — R05.2 SUBACUTE COUGH: ICD-10-CM

## 2025-02-03 DIAGNOSIS — B33.8 RSV INFECTION: ICD-10-CM

## 2025-02-03 DIAGNOSIS — R05.2 SUBACUTE COUGH: Primary | ICD-10-CM

## 2025-02-03 PROCEDURE — 3074F SYST BP LT 130 MM HG: CPT | Performed by: INTERNAL MEDICINE

## 2025-02-03 PROCEDURE — 99214 OFFICE O/P EST MOD 30 MIN: CPT | Performed by: INTERNAL MEDICINE

## 2025-02-03 PROCEDURE — 71046 X-RAY EXAM CHEST 2 VIEWS: CPT | Performed by: INTERNAL MEDICINE

## 2025-02-03 PROCEDURE — 3078F DIAST BP <80 MM HG: CPT | Performed by: INTERNAL MEDICINE

## 2025-02-03 PROCEDURE — 3008F BODY MASS INDEX DOCD: CPT | Performed by: INTERNAL MEDICINE

## 2025-02-03 RX ORDER — ALBUTEROL SULFATE 90 UG/1
2 INHALANT RESPIRATORY (INHALATION) EVERY 4 HOURS
Qty: 1 EACH | Refills: 3 | Status: SHIPPED | OUTPATIENT
Start: 2025-02-03

## 2025-02-03 RX ORDER — FLUTICASONE PROPIONATE AND SALMETEROL 250; 50 UG/1; UG/1
1 POWDER RESPIRATORY (INHALATION) 2 TIMES DAILY
Qty: 1 EACH | Refills: 1 | Status: SHIPPED | OUTPATIENT
Start: 2025-02-03

## 2025-02-03 RX ORDER — ALBUTEROL SULFATE 90 UG/1
2 INHALANT RESPIRATORY (INHALATION) EVERY 4 HOURS
COMMUNITY
End: 2025-02-03

## 2025-02-03 RX ORDER — BENZONATATE 200 MG/1
1 CAPSULE ORAL 3 TIMES DAILY PRN
COMMUNITY
Start: 2025-01-22

## 2025-02-03 NOTE — TELEPHONE ENCOUNTER
Patient is returning nurse call.  Please call and advise    Per patient it is ol to leave a detailed message

## 2025-02-03 NOTE — PROGRESS NOTES
Mercedez Garcia is a 59 year old female.  HPI:     Chief Complaint   Patient presents with    Follow - Up     Urgent care - RSV and pneumonia, still coughing      Mercedez was diagnosed with RSV late December.  At that time she had nasal congestion.  Head congestion.  Sneezing.  No real cough.  Fatigue.  She seemed to have recovered from that.    Then on January 22 went to another urgent care associate with [facility].  She indicates she was diagnosed with pneumonia and received doxycycline.  However when I went to look at the chest x-ray report there was no mention of pneumonia.  She took the doxycycline and felt better but now still has a cough.  Hacking cough.  No nasal congestion or sore throat.  Cough is mostly dry.    Indicates that she has had pneumonia x 2 and history of bronchitis.    No major history of smoking.  She was told that she had gestational asthma.    She has not so far had the flu vaccine or COVID booster.  Current Outpatient Medications   Medication Sig Dispense Refill    benzonatate 200 MG Oral Cap Take 1 capsule (200 mg total) by mouth 3 (three) times daily as needed.      albuterol 108 (90 Base) MCG/ACT Inhalation Aero Soln Inhale 2 puffs into the lungs every 4 (four) hours. 1 each 3    cholecalciferol (VITAMIN D3) 125 MCG (5000 UT) Oral Cap Take 1 capsule (5,000 Units total) by mouth daily.      fluticasone-salmeterol (ADVAIR DISKUS) 250-50 MCG/ACT Inhalation Aerosol Powder, Breath Activated Inhale 1 puff into the lungs 2 (two) times daily. 1 each 1    estradiol 0.0375 MG/24HR Transdermal Patch Weekly Place 1 patch onto the skin once a week.      progesterone 100 MG Oral Cap Take 1 capsule (100 mg total) by mouth daily.      Coenzyme Q10-Vitamin E (COQ10-VITAMIN E) 100-10 MG-UNIT Oral Cap Take 1 capsule by mouth daily.      B Complex Vitamins (VITAMIN B COMPLEX OR) Take 1 capsule by mouth daily.      magnesium 250 MG Oral Tab Take 1 tablet (250 mg total) by mouth daily.      Omega-3 Fatty  Acids (FISH OIL CONCENTRATE OR) Take 1 capsule by mouth daily.      ascorbic acid (VITAMIN C) 500 MG Oral Tab Take 1 tablet (500 mg total) by mouth daily.        Past Medical History:    Abnormal Pap smear of cervix    ascus pap, neg hpv, neg colpo    Basal cell carcinoma (BCC) of right lower extremity    Dr. Joy Reyes    Cancer ([facility])    Melanoma    Disorder of thyroid    Diverticulitis of sigmoid colon    2019-diverticultis tx [facility].  11/2020--[facility]    Hashimoto's disease    History of pneumonia    oupatient treatment    Hypothyroidism    Hashimoto's    Kidney infection    age 9, [facility]    Melanoma (HCC)    Melanoma R thigh, level 0.36 - Dr. Momin at [place]    Pneumonia    RSV (respiratory syncytial virus infection)    Squamous cell carcinoma in situ (SCCIS) of skin    Right leg-Dr. Joy Reyes    Vitamin D deficiency      Social History:  Social History     Socioeconomic History    Marital status:    Tobacco Use    Smoking status: Former    Smokeless tobacco: Never   Vaping Use    Vaping status: Never Used   Substance and Sexual Activity    Alcohol use: Yes     Alcohol/week: 3.0 standard drinks of alcohol     Types: 3 Glasses of wine per week     Comment: OCC    Drug use: No    Sexual activity: Yes     Partners: Male     Birth control/protection: Condom     Comment: 06/26/18: current         REVIEW OF SYSTEMS:   GENERAL HEALTH:  feels well otherwise  RESPIRATORY:  Voices no shortness of breath  CARDIOVASCULAR:  Voices no chest pain on exertion  GI:   Voices no abdominal pain or changes of bowels   :Viices no urning or frequency of urination.  NEURO:  Voices no  headaches or dizziness    EXAM:   /74   Pulse 82   Temp 98.1 °F (36.7 °C)   Ht 5' 6\" (1.676 m)   Wt 194 lb (88 kg)   LMP 02/18/2016   SpO2 98%   BMI 31.31 kg/m²     GENERAL:  well developed, well nourished, in no apparent distress  SKIN:  no rashes ,   HEENT: atraumatic.   Pharynx normal without  exudate.  EYES:  PERRL. Sclera anicteric.  NECK:  Supple,  no adenopathy,    LUNGS: Mostly clear.  I do hear some upper bronchial rhonchi but very minimal.  No wheezing.  No crackles..  Effort normal  CARDIO:  RRR without murmur.   S1 and S2 normal  GI:  good BS's,  no masses,   HSM or tenderness  EXTREMITIES : no cyanosis, clubbing or edema    ASSESSMENT AND PLAN:     1. Subacute cough  Subacute cough.  I suspect Mercedez has some postviral bronchospasm.  We talked about this.  I do not think she needs any oral steroids but I would give her Advair.  Interestingly I saw her in 2017 with something similar and I gave her Advair.  Will get chest x-ray just to exclude pneumonia as she will be going out of town to [place] for 2 months.  - fluticasone-salmeterol (ADVAIR DISKUS) 250-50 MCG/ACT Inhalation Aerosol Powder, Breath Activated; Inhale 1 puff into the lungs 2 (two) times daily.  Dispense: 1 each; Refill: 1  - XR CHEST PA + LAT CHEST (CPT=71046); Future    2. RSV infection  RSV infection late December around 29th and 30th.  This is resolved but it may have left her with some bronchospasm.  - fluticasone-salmeterol (ADVAIR DISKUS) 250-50 MCG/ACT Inhalation Aerosol Powder, Breath Activated; Inhale 1 puff into the lungs 2 (two) times daily.  Dispense: 1 each; Refill: 1  - XR CHEST PA + LAT CHEST (CPT=71046); Future    This visit was 30 minutes.  I spent 10 minutes before visit preparing and reviewing old records.  Greater than 50% of the visit was engaged in counseling and review of past data.     The patient indicates understanding of these issues and agrees to the plan.    Anup Otto MD  2/3/2025  1:35 PM

## 2025-05-06 ENCOUNTER — OFFICE VISIT (OUTPATIENT)
Dept: INTERNAL MEDICINE CLINIC | Facility: CLINIC | Age: 60
End: 2025-05-06
Payer: COMMERCIAL

## 2025-05-06 VITALS
TEMPERATURE: 98 F | DIASTOLIC BLOOD PRESSURE: 76 MMHG | HEART RATE: 72 BPM | OXYGEN SATURATION: 98 % | BODY MASS INDEX: 31.02 KG/M2 | WEIGHT: 193 LBS | HEIGHT: 66 IN | SYSTOLIC BLOOD PRESSURE: 124 MMHG

## 2025-05-06 DIAGNOSIS — Z00.00 ANNUAL PHYSICAL EXAM: Primary | ICD-10-CM

## 2025-05-06 PROCEDURE — 3078F DIAST BP <80 MM HG: CPT | Performed by: INTERNAL MEDICINE

## 2025-05-06 PROCEDURE — 99396 PREV VISIT EST AGE 40-64: CPT | Performed by: INTERNAL MEDICINE

## 2025-05-06 PROCEDURE — 3008F BODY MASS INDEX DOCD: CPT | Performed by: INTERNAL MEDICINE

## 2025-05-06 PROCEDURE — 3074F SYST BP LT 130 MM HG: CPT | Performed by: INTERNAL MEDICINE

## 2025-05-06 NOTE — PROGRESS NOTES
Mercedez Garcia is a 59 year old female.  Chief Complaint   Patient presents with    Physical     HPI:   Mercedez Garcia is a 59 year old female who presents for a complete physical exam.       LOV 10/6/23.    Since, she has been doing okay.    Very active - plays tennis, golf, pickleball, walks, does pilates. Denies CP or dyspnea w/activity.    Wt Readings from Last 6 Encounters:   05/06/25 193 lb (87.5 kg)   02/03/25 194 lb (88 kg)   10/06/23 188 lb 3.2 oz (85.4 kg)   12/12/22 180 lb (81.6 kg)   06/02/22 187 lb (84.8 kg)   12/13/21 181 lb (82.1 kg)     Body mass index is 31.15 kg/m².     Current Medications[1]   Past Medical History[2]   Past Surgical History[3]   Family History[4]   Social History:   Short Social Hx on File[5]       REVIEW OF SYSTEMS:   GENERAL: feels well otherwise  SKIN: denies any unusual skin lesions  EYES:denies blurred vision or double vision  HEENT: denies nasal congestion, sinus pain, ST, sore throat  LUNGS: denies shortness of breath with exertion, cough or wheezing  BREAST: denies masses or nipple discharge  CARDIOVASCULAR: denies chest pain, pressure, or palpitations  GI: denies abdominal pain, nausea, vomiting, diarrhea, constipation, hematochezia, or melena  : denies dysuria, urinary frequency, vaginal discharge, dyspareunia, heavy menses  NEURO: denies headaches, dizziness, focal deficits  PSYCHE: denies anxiety or depression  EXT: denies edema      EXAM:   /76   Pulse 72   Temp 98.2 °F (36.8 °C)   Ht 5' 6\" (1.676 m)   Wt 193 lb (87.5 kg)   LMP 02/18/2016   SpO2 98%   BMI 31.15 kg/m²     GENERAL: well developed, well nourished, in no apparent distress  HEENT: normal oropharynx, normal TM's  EYES: PERRLA, EOMI, conjunctivae are pink  NECK: supple, no cervical or supraclavicular LAD, no carotic bruits, no thyromegaly  BREAST: no dominant or suspicious mass, no axillary LAD  LUNGS: clear to auscultation  CARDIO: RRR, normal S1S2, no gallops or murmurs  GI: soft, NT,  ND, NABS, no HSM  GYNE: no vaginal or cervical lesions noted, no discharge. No cervical motion tenderness. No masses.   EXTREMITIES: no cyanosis, clubbing or edema, +2 radial and DP pulses bilaterally  NEURO: A&O x 3, moves all 4 extremities spontaneously      ASSESSMENT AND PLAN:   Mercedez Garcia is a 59 year old female who presents for a complete physical exam.       Annual physical exam  - advised pt to obtain the following labs fasting:  - CBC W Differential W Platelet [E]; Future  - Comp Metabolic Panel (14) [E]; Future  - Lipid Panel [E]; Future  - Urinalysis with Culture Reflex; Future    Hyperlipidemia, mixed  - repeat lipids  - calcium score 5/2024: 1     Hashimoto's thyroiditis  - Labs and therapy by functional medicine Dr. Lorie Tavares, has appointments q4 months via telemedicine  - On 30 mg NP thyroid daily     Vitamin D deficiency  - Monitored by functional medicine Dr. Lorie Tavares  - Continue supplementation, 1000 international units daily  - Last vitamin D level 5/17/23: 27.4     Microhematuria  - hx of microhematuria  - Saw urology DIEGO Bolivar  - cytology 6/23/21 negative  - renal ultrasound 7/20/21 negative     Hx of diverticulitis of sigmoid colon  - CT 11/16/20 w/non-specific colitis  - Colonoscopy 12/12/22 without diverticula seen but haustral fold hypertrophy, no ulcers polyps, or masses  - No flares since 2020     Hx skin cancers  - hx melanoma R thigh 2007  - hx of SCC & BCC R leg 10/2020 w/Dr. Joy Reyes     Healthcare Maintenance  Cancer Screenings:  - Breast: mammo birads 1 5/20/24, ordered per gyn  - Cervical: paps per Dr. Gorman, last 3/26/24 NILM  - Colon: 12/12/22 w/Dr. Herrera. Repeat in 5 years (2027). Hx adenomatous polyps 12/30/19. Father had colon cancer.   - Lung: Does not qualify as former smoker pack years <4     Vaccines:  - Tdap: 3/19/20  - Shingles: shingrix recommended  - Pneumonia: prevnar 20 recommended  - Flu: recommend yearly  - COVID-19: x3     Labs  ordered as above. Informed patient to expect messaging only if the labs are abnormal via patient preferred method of, communication: MyChart.      RTC in 1 year or sooner PRN.    For E/M code - 30 minutes spent reviewing performing chart review, obtaining a history, performing a physical exam, reviewing the assessment/plan, placing orders, and completing documentation.     Grisel Anna DO  5/6/2025  9:09 AM         [1]   Current Outpatient Medications   Medication Sig Dispense Refill    cholecalciferol (VITAMIN D3) 125 MCG (5000 UT) Oral Cap Take 1 capsule (5,000 Units total) by mouth daily.      estradiol 0.0375 MG/24HR Transdermal Patch Weekly Place 1 patch onto the skin once a week.      progesterone 100 MG Oral Cap Take 1 capsule (100 mg total) by mouth daily.      Coenzyme Q10-Vitamin E (COQ10-VITAMIN E) 100-10 MG-UNIT Oral Cap Take 1 capsule by mouth daily.      B Complex Vitamins (VITAMIN B COMPLEX OR) Take 1 capsule by mouth daily.      magnesium 250 MG Oral Tab Take 1 tablet (250 mg total) by mouth daily.      Omega-3 Fatty Acids (FISH OIL CONCENTRATE OR) Take 1 capsule by mouth daily.      ascorbic acid (VITAMIN C) 500 MG Oral Tab Take 1 tablet (500 mg total) by mouth daily.     [2]   Past Medical History:   Abnormal Pap smear of cervix    ascus pap, neg hpv, neg colpo    Basal cell carcinoma (BCC) of right lower extremity    Dr. Joy Reyes    Cancer ([facility])    Melanoma    Disorder of thyroid    Diverticulitis of sigmoid colon    2019-diverticultis [facility].  11/2020--[facility]    Hashimoto's disease    History of pneumonia    oupatient treatment    Hypothyroidism    Hashimoto's    Kidney infection    age 9, [facility]    Melanoma (HCC)    Melanoma R thigh, level 0.36 - Dr. Momin at [place]    Pneumonia    RSV (respiratory syncytial virus infection)    Squamous cell carcinoma in situ (SCCIS) of skin    Right leg-Dr. Joy Reyes    Vitamin D deficiency   [3]   Past Surgical  History:  Procedure Laterality Date          x2    Colonoscopy  2014    DR HERRERA MDDS    Colonoscopy  2019    Colonoscopy N/A 2022    Procedure: COLONOSCOPY;  Surgeon: Nazario Herrera MD;  Location: [facility] ENDOSCOPY    Ct abdomen and pelvis with contrast  2019    acute sigmoid diverticulitis    D & c      In alexy my daniel births    Skin surgery  Multiple Basil Cell   [4]   Family History  Problem Relation Age of Onset    Other (diverticulitis) Mother         growth on colon, not cancerous, ?SBOs    Melanoma Mother     Dementia Mother     Cancer Mother         Melanoma    Heart Attack Father          age 81    Arthritis Father     Melanoma Father     Colon Cancer Father     Other (esophageal cancer) Father         parkinsons    Other (parkinson's) Father         Colon, Melanoma, Esophageal Cancer    Mental Disorder Brother         schizophrenia    Stroke Paternal Grandmother     Other (2 brothers, 1 sister) Other     Anxiety Son    [5]   Social History  Socioeconomic History    Marital status:    Tobacco Use    Smoking status: Former    Smokeless tobacco: Never   Vaping Use    Vaping status: Never Used   Substance and Sexual Activity    Alcohol use: Yes     Alcohol/week: 3.0 standard drinks of alcohol     Types: 3 Glasses of wine per week     Comment: OCC    Drug use: No    Sexual activity: Yes     Partners: Male     Birth control/protection: Condom     Comment: 19: current

## 2025-05-10 ENCOUNTER — LAB ENCOUNTER (OUTPATIENT)
Dept: LAB | Facility: HOSPITAL | Age: 60
End: 2025-05-10
Attending: INTERNAL MEDICINE
Payer: COMMERCIAL

## 2025-05-10 DIAGNOSIS — Z00.00 ANNUAL PHYSICAL EXAM: ICD-10-CM

## 2025-05-10 LAB
ALBUMIN SERPL-MCNC: 4.5 G/DL (ref 3.2–4.8)
ALBUMIN/GLOB SERPL: 2.1 {RATIO} (ref 1–2)
ALP LIVER SERPL-CCNC: 69 U/L (ref 46–118)
ALT SERPL-CCNC: 40 U/L (ref 10–49)
ANION GAP SERPL CALC-SCNC: 8 MMOL/L (ref 0–18)
AST SERPL-CCNC: 31 U/L (ref ?–34)
BASOPHILS # BLD AUTO: 0.02 X10(3) UL (ref 0–0.2)
BASOPHILS NFR BLD AUTO: 0.3 %
BILIRUB SERPL-MCNC: 1 MG/DL (ref 0.3–1.2)
BILIRUB UR QL: NEGATIVE
BUN BLD-MCNC: 15 MG/DL (ref 9–23)
BUN/CREAT SERPL: 15.8 (ref 10–20)
CALCIUM BLD-MCNC: 9 MG/DL (ref 8.7–10.4)
CHLORIDE SERPL-SCNC: 107 MMOL/L (ref 98–112)
CHOLEST SERPL-MCNC: 196 MG/DL (ref ?–200)
CLARITY UR: CLEAR
CO2 SERPL-SCNC: 27 MMOL/L (ref 21–32)
COLOR UR: YELLOW
CREAT BLD-MCNC: 0.95 MG/DL (ref 0.55–1.02)
DEPRECATED RDW RBC AUTO: 42.8 FL (ref 35.1–46.3)
EGFRCR SERPLBLD CKD-EPI 2021: 69 ML/MIN/1.73M2 (ref 60–?)
EOSINOPHIL # BLD AUTO: 0.08 X10(3) UL (ref 0–0.7)
EOSINOPHIL NFR BLD AUTO: 1.3 %
ERYTHROCYTE [DISTWIDTH] IN BLOOD BY AUTOMATED COUNT: 13.1 % (ref 11–15)
FASTING PATIENT LIPID ANSWER: YES
FASTING STATUS PATIENT QL REPORTED: YES
GLOBULIN PLAS-MCNC: 2.1 G/DL (ref 2–3.5)
GLUCOSE BLD-MCNC: 95 MG/DL (ref 70–99)
GLUCOSE UR-MCNC: NORMAL MG/DL
HCT VFR BLD AUTO: 41.9 % (ref 35–48)
HDLC SERPL-MCNC: 56 MG/DL (ref 40–59)
HGB BLD-MCNC: 14.7 G/DL (ref 12–16)
HGB UR QL STRIP.AUTO: NEGATIVE
IMM GRANULOCYTES # BLD AUTO: 0.01 X10(3) UL (ref 0–1)
IMM GRANULOCYTES NFR BLD: 0.2 %
KETONES UR-MCNC: NEGATIVE MG/DL
LDLC SERPL CALC-MCNC: 120 MG/DL (ref ?–100)
LEUKOCYTE ESTERASE UR QL STRIP.AUTO: NEGATIVE
LYMPHOCYTES # BLD AUTO: 2.84 X10(3) UL (ref 1–4)
LYMPHOCYTES NFR BLD AUTO: 44.7 %
MCH RBC QN AUTO: 31.5 PG (ref 26–34)
MCHC RBC AUTO-ENTMCNC: 35.1 G/DL (ref 31–37)
MCV RBC AUTO: 89.7 FL (ref 80–100)
MONOCYTES # BLD AUTO: 0.37 X10(3) UL (ref 0.1–1)
MONOCYTES NFR BLD AUTO: 5.8 %
NEUTROPHILS # BLD AUTO: 3.03 X10 (3) UL (ref 1.5–7.7)
NEUTROPHILS # BLD AUTO: 3.03 X10(3) UL (ref 1.5–7.7)
NEUTROPHILS NFR BLD AUTO: 47.7 %
NITRITE UR QL STRIP.AUTO: NEGATIVE
NONHDLC SERPL-MCNC: 140 MG/DL (ref ?–130)
OSMOLALITY SERPL CALC.SUM OF ELEC: 295 MOSM/KG (ref 275–295)
PH UR: 5.5 [PH] (ref 5–8)
PLATELET # BLD AUTO: 273 10(3)UL (ref 150–450)
POTASSIUM SERPL-SCNC: 4.1 MMOL/L (ref 3.5–5.1)
PROT SERPL-MCNC: 6.6 G/DL (ref 5.7–8.2)
PROT UR-MCNC: NEGATIVE MG/DL
RBC # BLD AUTO: 4.67 X10(6)UL (ref 3.8–5.3)
SODIUM SERPL-SCNC: 142 MMOL/L (ref 136–145)
SP GR UR STRIP: 1.01 (ref 1–1.03)
TRIGL SERPL-MCNC: 113 MG/DL (ref 30–149)
UROBILINOGEN UR STRIP-ACNC: NORMAL
VLDLC SERPL CALC-MCNC: 20 MG/DL (ref 0–30)
WBC # BLD AUTO: 6.4 X10(3) UL (ref 4–11)

## 2025-05-10 PROCEDURE — 36415 COLL VENOUS BLD VENIPUNCTURE: CPT

## 2025-05-10 PROCEDURE — 81003 URINALYSIS AUTO W/O SCOPE: CPT

## 2025-05-10 PROCEDURE — 80053 COMPREHEN METABOLIC PANEL: CPT

## 2025-05-10 PROCEDURE — 80061 LIPID PANEL: CPT

## 2025-05-10 PROCEDURE — 85025 COMPLETE CBC W/AUTO DIFF WBC: CPT

## 2025-06-19 ENCOUNTER — TELEPHONE (OUTPATIENT)
Dept: INTERNAL MEDICINE CLINIC | Facility: CLINIC | Age: 60
End: 2025-06-19

## (undated) DEVICE — STERILE LATEX POWDER-FREE SURGICAL GLOVESWITH NITRILE COATING: Brand: PROTEXIS

## (undated) DEVICE — 3 ML SYRINGE LUER-LOCK TIP: Brand: MONOJECT

## (undated) DEVICE — 6 ML SYRINGE LUER-LOCK TIP: Brand: MONOJECT

## (undated) DEVICE — MEDI-VAC NON-CONDUCTIVE SUCTION TUBING 6MM X 1.8M (6FT.) L: Brand: CARDINAL HEALTH

## (undated) DEVICE — KIT ENDO ORCAPOD 160/180/190

## (undated) DEVICE — LINE MNTR ADLT SET O2 INTMD

## (undated) DEVICE — Device: Brand: DEFENDO AIR/WATER/SUCTION AND BIOPSY VALVE

## (undated) DEVICE — 35 ML SYRINGE REGULAR TIP: Brand: MONOJECT

## (undated) NOTE — ED AVS SNAPSHOT
[facility] [address] [address]    Phone:  [phone]    Fax:  [facility]   MRN: [id number]    Department:  [facility] AND [facility] in [facility]   Date of Visit:  1/1 under your health insurance plan. Please contact your insurance company and physician's office to determine coverage and benefits available for follow-up care and referrals.      It is our goal to assure that you are completely satisfied with every aspect doctor until you can check with your doctor. Please bring the medication list to your next doctor's appointment. Any imaging studies and labs completed today can be reviewed in your [employer] account.   You may have had testing done that requires us to co Stephanie [address]. MyChart     Visit [facility]  You can access your MyChart to more actively manage your health care and view more details from this visit by going to [email or web address].   If you've recently had a stay at the [facility]

## (undated) NOTE — MR AVS SNAPSHOT
[address]  [address]  [phone]               Thank you for choosing us for your health care visit with Canelo Michaud DPM.  We are glad to serve you and happy to provide yo have any questions related to insurance coverage.          Reason for Today's Visit     Foot Pain           Medical Issues Discussed Today     Peroneal tendonitis, right    -  Primary      Instructions and Information about Your Health     None      Allergi

## (undated) NOTE — MR AVS SNAPSHOT
[facility]  [address], [address] [place] [address]   [phone]               Thank you for choosing us for your health care visit with Danisha Oliver DPM.  We are glad to serve you and happy to provide you with this summ - methylPREDNISolone 4 MG Tbpk            MyChart     Visit MyChart  You can access your MyChart to more actively manage your health care and view more details from this visit by going to [email or web address].   If you've recently had a stay at the

## (undated) NOTE — MR AVS SNAPSHOT
[address] [place] [address]  [phone]               Thank you for choosing us for your health care visit with Hilario Hogue MD.  We are glad to serve you and happy to provide you with this summary of your visit.   Please Bring a paper prescription for each of these medications    - fluticasone-salmeterol 250-50 MCG/DOSE Aepb            Follow-up Instructions     Return if symptoms worsen or fail to improve.          MyChart     Visit [employer]  You can access your [employer] to

## (undated) NOTE — MR AVS SNAPSHOT
[address]  [address]  [phone]               Thank you for choosing us for your health care visit with Canelo Michaud DPM.  We are glad to serve you and happy to provide yo [employer] [address] [place], [place] [address] for making healthy food choices  -   Enjoy your food, but eat less. Fully enjoy your food when eating. Don’t eat while distracted and slow down. Avoid over sized portions. Don’t eat while when you’re bored.

## (undated) NOTE — LETTER
[address]  Authorization for Invasive Procedure                                                                                           1. I hereby authorize Zulema Casey MD, my physician and his/her assistants (if applicable), which may include medical students, residents, and/or fellows, to perform the following surgical operation/ procedure and administer such anesthesia as may be determined necessary by my physician: Operation/Procedure name (s) COLONOSCOPY on [address] [facility]   2. I recognize that during the surgical operation/procedure, unforeseen conditions may necessitate additional or different procedures than those listed above. I, therefore, further authorize and request that the above-named surgeon, assistants, or designees perform such procedures as are, in their judgment, necessary and desirable. 3.   My surgeon/physician has discussed prior to my surgery the potential benefits, risks and side effects of this procedure; the likelihood of achieving goals; and potential problems that might occur during recuperation. They also discussed reasonable alternatives to the procedure, including risks, benefits, and side effects related to the alternatives and risks related to not receiving this procedure. I have had all my questions answered and I acknowledge that no guarantee has been made as to the result that may be obtained. 4.   Should the need arise during my operation/procedure, which includes change of level of care prior to discharge, I also consent to the administration of blood and/or blood products. Further, I understand that despite careful testing and screening of blood or blood products by collecting agencies, I may still be subject to ill effects as a result of receiving a blood transfusion and/or blood products.   The following are some, but not all, of the potential risks that can occur: fever and allergic reactions, hemolytic reactions, transmission of diseases such as Hepatitis, AIDS and Cytomegalovirus (CMV) and fluid overload. In the event that I wish to have an autologous transfusion of my own blood, or a directed donor transfusion, I will discuss this with my physician. Check only if Refusing Blood or Blood Products  I understand refusal of blood or blood products as deemed necessary by my physician may have serious consequences to my condition to include possible death. I hereby assume responsibility for my refusal and release the hospital, its personnel, and my physicians from any responsibility for the consequences of my refusal.    o  Refuse   5. I authorize the use of any specimen, organs, tissues, body parts or foreign objects that may be removed from my body during the operation/procedure for diagnosis, research or teaching purposes and their subsequent disposal by hospital authorities. I also authorize the release of specimen test results and/or written reports to my treating physician on the hospital medical staff or other referring or consulting physicians involved in my care, at the discretion of the Pathologist or my treating physician. 6.   I consent to the photographing or videotaping of the operations or procedures to be performed, including appropriate portions of my body for medical, scientific, or educational purposes, provided my identity is not revealed by the pictures or by descriptive texts accompanying them. If the procedure has been photographed/videotaped, the surgeon will obtain the original picture, image, videotape or CD. The hospital will not be responsible for storage, release or maintenance of the picture, image, tape or CD.    7.   I consent to the presence of a  or observers in the operating room as deemed necessary by my physician or their designees.     8.   I recognize that in the event my procedure results in extended X-Ray/fluoroscopy time, I may develop a skin reaction. 9. If I have a Do Not Attempt Resuscitation (DNAR) order in place, that status will be suspended while in the operating room, procedural suite, and during the recovery period unless otherwise explicitly stated by me (or a person authorized to consent on my behalf). The surgeon or my attending physician will determine when the applicable recovery period ends for purposes of reinstating the DNAR order. 10. Patients having a sterilization procedure: I understand that if the procedure is successful the results will be permanent and it will therefore be impossible for me to inseminate, conceive, or bear children. I also understand that the procedure is intended to result in sterility, although the result has not been guaranteed. 11. I acknowledge that my physician has explained sedation/analgesia administration to me including the risk and benefits I consent to the administration of sedation/analgesia as may be necessary or desirable in the judgment of my physician. I CERTIFY THAT I HAVE READ AND FULLY UNDERSTAND THE ABOVE CONSENT TO OPERATION and/or OTHER PROCEDURE.     _________________________________________ _________________________________     ___________________________________  Signature of Patient     Signature of Responsible Person                   Printed Name of Responsible Person                              _________________________________________ ______________________________        ___________________________________  Signature of Witness         Date  Time         Relationship to Patient    STATEMENT OF PHYSICIAN My signature below affirms that prior to the time of the procedure; I have explained to the patient and/or his/her legal representative, the risks and benefits involved in the proposed treatment and any reasonable alternative to the proposed treatment.  I have also explained the risks and benefits involved in refusal of the proposed treatment and alternatives to the proposed treatment and have answered the patient's questions.  If I have a significant financial interest in a co-management agreement or a significant financial interest in any product or implant, or other significant relationship used in this procedure/surgery, I have disclosed this and had a discussion with my patient.     _______________________________________________________________ _____________________________  Tianna Pastel of Physician)                                                                                         (Date)                                   (Time)  Patient Name: Minus Ormond    : 1965   Printed: 2022      Medical Record #: [id number]                                              Page 1 of 1